# Patient Record
Sex: FEMALE | Race: WHITE | HISPANIC OR LATINO | Employment: FULL TIME | URBAN - METROPOLITAN AREA
[De-identification: names, ages, dates, MRNs, and addresses within clinical notes are randomized per-mention and may not be internally consistent; named-entity substitution may affect disease eponyms.]

---

## 2020-07-24 ENCOUNTER — HOSPITAL ENCOUNTER (EMERGENCY)
Facility: HOSPITAL | Age: 60
Discharge: HOME | End: 2020-07-24
Attending: EMERGENCY MEDICINE | Admitting: EMERGENCY MEDICINE

## 2020-07-24 ENCOUNTER — APPOINTMENT (OUTPATIENT)
Dept: CT IMAGING | Facility: HOSPITAL | Age: 60
End: 2020-07-24

## 2020-07-24 ENCOUNTER — APPOINTMENT (OUTPATIENT)
Dept: GENERAL RADIOLOGY | Facility: HOSPITAL | Age: 60
End: 2020-07-24

## 2020-07-24 ENCOUNTER — APPOINTMENT (OUTPATIENT)
Dept: CARDIOLOGY | Facility: HOSPITAL | Age: 60
End: 2020-07-24

## 2020-07-24 VITALS
HEIGHT: 64 IN | DIASTOLIC BLOOD PRESSURE: 74 MMHG | BODY MASS INDEX: 34.51 KG/M2 | TEMPERATURE: 98.4 F | HEART RATE: 87 BPM | WEIGHT: 202.16 LBS | SYSTOLIC BLOOD PRESSURE: 142 MMHG | OXYGEN SATURATION: 98 % | RESPIRATION RATE: 16 BRPM

## 2020-07-24 DIAGNOSIS — S16.1XXA STRAIN OF NECK MUSCLE, INITIAL ENCOUNTER: Primary | ICD-10-CM

## 2020-07-24 DIAGNOSIS — S96.912A STRAIN OF LEFT ANKLE, INITIAL ENCOUNTER: ICD-10-CM

## 2020-07-24 LAB
BH CV LOWER VASCULAR LEFT COMMON FEMORAL AUGMENT: NORMAL
BH CV LOWER VASCULAR LEFT COMMON FEMORAL COMPETENT: NORMAL
BH CV LOWER VASCULAR LEFT COMMON FEMORAL COMPRESS: NORMAL
BH CV LOWER VASCULAR LEFT COMMON FEMORAL PHASIC: NORMAL
BH CV LOWER VASCULAR LEFT COMMON FEMORAL SPONT: NORMAL
BH CV LOWER VASCULAR LEFT DISTAL FEMORAL COMPRESS: NORMAL
BH CV LOWER VASCULAR LEFT GASTRONEMIUS COMPRESS: NORMAL
BH CV LOWER VASCULAR LEFT GREATER SAPH AK COMPRESS: NORMAL
BH CV LOWER VASCULAR LEFT GREATER SAPH BK COMPRESS: NORMAL
BH CV LOWER VASCULAR LEFT LESSER SAPH COMPRESS: NORMAL
BH CV LOWER VASCULAR LEFT MID FEMORAL AUGMENT: NORMAL
BH CV LOWER VASCULAR LEFT MID FEMORAL COMPETENT: NORMAL
BH CV LOWER VASCULAR LEFT MID FEMORAL COMPRESS: NORMAL
BH CV LOWER VASCULAR LEFT MID FEMORAL PHASIC: NORMAL
BH CV LOWER VASCULAR LEFT MID FEMORAL SPONT: NORMAL
BH CV LOWER VASCULAR LEFT PERONEAL COMPRESS: NORMAL
BH CV LOWER VASCULAR LEFT POPLITEAL AUGMENT: NORMAL
BH CV LOWER VASCULAR LEFT POPLITEAL COMPETENT: NORMAL
BH CV LOWER VASCULAR LEFT POPLITEAL COMPRESS: NORMAL
BH CV LOWER VASCULAR LEFT POPLITEAL PHASIC: NORMAL
BH CV LOWER VASCULAR LEFT POPLITEAL SPONT: NORMAL
BH CV LOWER VASCULAR LEFT POSTERIOR TIBIAL COMPRESS: NORMAL
BH CV LOWER VASCULAR LEFT PROXIMAL FEMORAL COMPRESS: NORMAL
BH CV LOWER VASCULAR LEFT SAPHENOFEMORAL JUNCTION AUGMENT: NORMAL
BH CV LOWER VASCULAR LEFT SAPHENOFEMORAL JUNCTION COMPETENT: NORMAL
BH CV LOWER VASCULAR LEFT SAPHENOFEMORAL JUNCTION COMPRESS: NORMAL
BH CV LOWER VASCULAR LEFT SAPHENOFEMORAL JUNCTION PHASIC: NORMAL
BH CV LOWER VASCULAR LEFT SAPHENOFEMORAL JUNCTION SPONT: NORMAL
BH CV LOWER VASCULAR RIGHT COMMON FEMORAL AUGMENT: NORMAL
BH CV LOWER VASCULAR RIGHT COMMON FEMORAL COMPETENT: NORMAL
BH CV LOWER VASCULAR RIGHT COMMON FEMORAL COMPRESS: NORMAL
BH CV LOWER VASCULAR RIGHT COMMON FEMORAL PHASIC: NORMAL
BH CV LOWER VASCULAR RIGHT COMMON FEMORAL SPONT: NORMAL

## 2020-07-24 PROCEDURE — 72125 CT NECK SPINE W/O DYE: CPT

## 2020-07-24 PROCEDURE — 73610 X-RAY EXAM OF ANKLE: CPT

## 2020-07-24 PROCEDURE — 99282 EMERGENCY DEPT VISIT SF MDM: CPT

## 2020-07-24 PROCEDURE — 93971 EXTREMITY STUDY: CPT

## 2020-07-24 RX ORDER — MELOXICAM 15 MG/1
15 TABLET ORAL DAILY
Qty: 10 TABLET | Refills: 0 | Status: SHIPPED | OUTPATIENT
Start: 2020-07-24 | End: 2020-11-13 | Stop reason: RX
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
## 2020-11-13 ENCOUNTER — APPOINTMENT (OUTPATIENT)
Dept: CARDIOLOGY | Facility: HOSPITAL | Age: 60
End: 2020-11-13

## 2020-11-13 ENCOUNTER — HOSPITAL ENCOUNTER (OUTPATIENT)
Facility: HOSPITAL | Age: 60
Discharge: HOME | End: 2020-11-15
Attending: EMERGENCY MEDICINE | Admitting: INTERNAL MEDICINE

## 2020-11-13 ENCOUNTER — APPOINTMENT (OUTPATIENT)
Dept: GENERAL RADIOLOGY | Facility: HOSPITAL | Age: 60
End: 2020-11-13

## 2020-11-13 DIAGNOSIS — I21.3 ST ELEVATION MYOCARDIAL INFARCTION (STEMI), UNSPECIFIED ARTERY (HCC): Primary | ICD-10-CM

## 2020-11-13 PROBLEM — E66.9 OBESITY (BMI 30-39.9): Chronic | Status: ACTIVE | Noted: 2020-11-13

## 2020-11-13 PROBLEM — E78.00 HYPERCHOLESTEROLEMIA: Status: ACTIVE | Noted: 2020-11-13

## 2020-11-13 PROBLEM — I51.81 TAKOTSUBO CARDIOMYOPATHY: Status: ACTIVE | Noted: 2020-11-13

## 2020-11-13 PROBLEM — R07.9 CHEST PAIN: Status: ACTIVE | Noted: 2020-11-13

## 2020-11-13 PROBLEM — E11.9 DIABETES MELLITUS (HCC): Status: ACTIVE | Noted: 2020-11-13

## 2020-11-13 PROBLEM — I10 HTN (HYPERTENSION): Chronic | Status: ACTIVE | Noted: 2020-11-13

## 2020-11-13 PROBLEM — E03.9 HYPOTHYROIDISM: Status: ACTIVE | Noted: 2020-11-13

## 2020-11-13 LAB
ACT BLD: 153 SECONDS (ref 89–137)
ALBUMIN SERPL-MCNC: 4.3 G/DL (ref 3.5–5.2)
ALBUMIN/GLOB SERPL: 1.4 G/DL
ALP SERPL-CCNC: 108 U/L (ref 39–117)
ALT SERPL W P-5'-P-CCNC: 15 U/L (ref 1–33)
ANION GAP SERPL CALCULATED.3IONS-SCNC: 13 MMOL/L (ref 5–15)
APTT PPP: 23.6 SECONDS (ref 24–31)
AST SERPL-CCNC: 17 U/L (ref 1–32)
BASOPHILS # BLD AUTO: 0 10*3/MM3 (ref 0–0.2)
BASOPHILS NFR BLD AUTO: 0.5 % (ref 0–1.5)
BILIRUB SERPL-MCNC: 0.6 MG/DL (ref 0–1.2)
BUN SERPL-MCNC: 14 MG/DL (ref 8–23)
BUN/CREAT SERPL: 21.5 (ref 7–25)
CALCIUM SPEC-SCNC: 9.4 MG/DL (ref 8.6–10.5)
CHLORIDE SERPL-SCNC: 104 MMOL/L (ref 98–107)
CO2 SERPL-SCNC: 26 MMOL/L (ref 22–29)
CREAT SERPL-MCNC: 0.65 MG/DL (ref 0.57–1)
DEPRECATED RDW RBC AUTO: 41.6 FL (ref 37–54)
EOSINOPHIL # BLD AUTO: 0.1 10*3/MM3 (ref 0–0.4)
EOSINOPHIL NFR BLD AUTO: 1.5 % (ref 0.3–6.2)
ERYTHROCYTE [DISTWIDTH] IN BLOOD BY AUTOMATED COUNT: 14 % (ref 12.3–15.4)
GFR SERPL CREATININE-BSD FRML MDRD: 93 ML/MIN/1.73
GLOBULIN UR ELPH-MCNC: 3.1 GM/DL
GLUCOSE BLDC GLUCOMTR-MCNC: 209 MG/DL (ref 70–105)
GLUCOSE BLDC GLUCOMTR-MCNC: 214 MG/DL (ref 70–105)
GLUCOSE BLDC GLUCOMTR-MCNC: 226 MG/DL (ref 70–105)
GLUCOSE SERPL-MCNC: 248 MG/DL (ref 65–99)
HBA1C MFR BLD: 8.9 % (ref 3.5–5.6)
HCT VFR BLD AUTO: 39.2 % (ref 34–46.6)
HGB BLD-MCNC: 12.9 G/DL (ref 12–15.9)
HOLD SPECIMEN: NORMAL
HOLD SPECIMEN: NORMAL
INR PPP: <0.93 (ref 0.93–1.1)
LYMPHOCYTES # BLD AUTO: 2.3 10*3/MM3 (ref 0.7–3.1)
LYMPHOCYTES NFR BLD AUTO: 25.8 % (ref 19.6–45.3)
MAGNESIUM SERPL-MCNC: 1.8 MG/DL (ref 1.6–2.4)
MCH RBC QN AUTO: 27.5 PG (ref 26.6–33)
MCHC RBC AUTO-ENTMCNC: 32.9 G/DL (ref 31.5–35.7)
MCV RBC AUTO: 83.6 FL (ref 79–97)
MONOCYTES # BLD AUTO: 0.5 10*3/MM3 (ref 0.1–0.9)
MONOCYTES NFR BLD AUTO: 5.5 % (ref 5–12)
NEUTROPHILS NFR BLD AUTO: 5.9 10*3/MM3 (ref 1.7–7)
NEUTROPHILS NFR BLD AUTO: 66.7 % (ref 42.7–76)
NRBC BLD AUTO-RTO: 0 /100 WBC (ref 0–0.2)
PLATELET # BLD AUTO: 318 10*3/MM3 (ref 140–450)
PMV BLD AUTO: 8.4 FL (ref 6–12)
POTASSIUM SERPL-SCNC: 4.1 MMOL/L (ref 3.5–5.2)
PROT SERPL-MCNC: 7.4 G/DL (ref 6–8.5)
PROTHROMBIN TIME: 10.1 SECONDS (ref 9.6–11.7)
RBC # BLD AUTO: 4.69 10*6/MM3 (ref 3.77–5.28)
SARS-COV-2 RNA PNL SPEC NAA+PROBE: NOT DETECTED
SODIUM SERPL-SCNC: 143 MMOL/L (ref 136–145)
TROPONIN T SERPL-MCNC: 0.14 NG/ML (ref 0–0.03)
TROPONIN T SERPL-MCNC: 0.17 NG/ML (ref 0–0.03)
WBC # BLD AUTO: 8.9 10*3/MM3 (ref 3.4–10.8)
WHOLE BLOOD HOLD SPECIMEN: NORMAL
WHOLE BLOOD HOLD SPECIMEN: NORMAL

## 2020-11-13 PROCEDURE — 83735 ASSAY OF MAGNESIUM: CPT | Performed by: EMERGENCY MEDICINE

## 2020-11-13 PROCEDURE — 93306 TTE W/DOPPLER COMPLETE: CPT | Performed by: INTERNAL MEDICINE

## 2020-11-13 PROCEDURE — 82962 GLUCOSE BLOOD TEST: CPT

## 2020-11-13 PROCEDURE — 96368 THER/DIAG CONCURRENT INF: CPT

## 2020-11-13 PROCEDURE — 93005 ELECTROCARDIOGRAM TRACING: CPT | Performed by: EMERGENCY MEDICINE

## 2020-11-13 PROCEDURE — G0378 HOSPITAL OBSERVATION PER HR: HCPCS

## 2020-11-13 PROCEDURE — C1769 GUIDE WIRE: HCPCS | Performed by: INTERNAL MEDICINE

## 2020-11-13 PROCEDURE — 85025 COMPLETE CBC W/AUTO DIFF WBC: CPT | Performed by: EMERGENCY MEDICINE

## 2020-11-13 PROCEDURE — 84484 ASSAY OF TROPONIN QUANT: CPT | Performed by: EMERGENCY MEDICINE

## 2020-11-13 PROCEDURE — 87635 SARS-COV-2 COVID-19 AMP PRB: CPT | Performed by: EMERGENCY MEDICINE

## 2020-11-13 PROCEDURE — 85347 COAGULATION TIME ACTIVATED: CPT

## 2020-11-13 PROCEDURE — 25010000002 EPTIFIBATIDE PER 5 MG: Performed by: EMERGENCY MEDICINE

## 2020-11-13 PROCEDURE — 93458 L HRT ARTERY/VENTRICLE ANGIO: CPT | Performed by: INTERNAL MEDICINE

## 2020-11-13 PROCEDURE — 85610 PROTHROMBIN TIME: CPT | Performed by: EMERGENCY MEDICINE

## 2020-11-13 PROCEDURE — 96365 THER/PROPH/DIAG IV INF INIT: CPT

## 2020-11-13 PROCEDURE — 93306 TTE W/DOPPLER COMPLETE: CPT

## 2020-11-13 PROCEDURE — 99284 EMERGENCY DEPT VISIT MOD MDM: CPT

## 2020-11-13 PROCEDURE — 96375 TX/PRO/DX INJ NEW DRUG ADDON: CPT

## 2020-11-13 PROCEDURE — 25010000002 ONDANSETRON PER 1 MG: Performed by: EMERGENCY MEDICINE

## 2020-11-13 PROCEDURE — C1894 INTRO/SHEATH, NON-LASER: HCPCS | Performed by: INTERNAL MEDICINE

## 2020-11-13 PROCEDURE — 0 IOPAMIDOL PER 1 ML: Performed by: INTERNAL MEDICINE

## 2020-11-13 PROCEDURE — C9803 HOPD COVID-19 SPEC COLLECT: HCPCS

## 2020-11-13 PROCEDURE — 80053 COMPREHEN METABOLIC PANEL: CPT | Performed by: EMERGENCY MEDICINE

## 2020-11-13 PROCEDURE — 25010000002 HEPARIN (PORCINE) PER 1000 UNITS: Performed by: EMERGENCY MEDICINE

## 2020-11-13 PROCEDURE — 25010000002 FENTANYL CITRATE (PF) 100 MCG/2ML SOLUTION: Performed by: EMERGENCY MEDICINE

## 2020-11-13 PROCEDURE — 83036 HEMOGLOBIN GLYCOSYLATED A1C: CPT | Performed by: NURSE PRACTITIONER

## 2020-11-13 PROCEDURE — 85730 THROMBOPLASTIN TIME PARTIAL: CPT | Performed by: EMERGENCY MEDICINE

## 2020-11-13 PROCEDURE — 71045 X-RAY EXAM CHEST 1 VIEW: CPT

## 2020-11-13 PROCEDURE — 25010000002 SULFUR HEXAFLUORIDE MICROSPH 60.7-25 MG RECONSTITUTED SUSPENSION: Performed by: STUDENT IN AN ORGANIZED HEALTH CARE EDUCATION/TRAINING PROGRAM

## 2020-11-13 PROCEDURE — 99245 OFF/OP CONSLTJ NEW/EST HI 55: CPT | Performed by: INTERNAL MEDICINE

## 2020-11-13 RX ORDER — ATORVASTATIN CALCIUM 80 MG/1
40 TABLET, FILM COATED ORAL DAILY
COMMUNITY

## 2020-11-13 RX ORDER — METOPROLOL SUCCINATE 50 MG/1
50 TABLET, EXTENDED RELEASE ORAL DAILY
COMMUNITY

## 2020-11-13 RX ORDER — FENTANYL CITRATE 50 UG/ML
INJECTION, SOLUTION INTRAMUSCULAR; INTRAVENOUS
Status: COMPLETED | OUTPATIENT
Start: 2020-11-13 | End: 2020-11-13

## 2020-11-13 RX ORDER — SODIUM CHLORIDE 9 MG/ML
75 INJECTION, SOLUTION INTRAVENOUS CONTINUOUS
Status: DISCONTINUED | OUTPATIENT
Start: 2020-11-13 | End: 2020-11-15 | Stop reason: HOSPADM

## 2020-11-13 RX ORDER — SODIUM CHLORIDE 0.9 % (FLUSH) 0.9 %
10 SYRINGE (ML) INJECTION AS NEEDED
Status: DISCONTINUED | OUTPATIENT
Start: 2020-11-13 | End: 2020-11-15 | Stop reason: HOSPADM

## 2020-11-13 RX ORDER — SERTRALINE HYDROCHLORIDE 25 MG/1
50 TABLET, FILM COATED ORAL DAILY PRN
Status: ON HOLD | COMMUNITY
End: 2020-11-15 | Stop reason: SDUPTHER

## 2020-11-13 RX ORDER — ASPIRIN 325 MG
325 TABLET ORAL ONCE
Status: DISCONTINUED | OUTPATIENT
Start: 2020-11-13 | End: 2020-11-13 | Stop reason: SDUPTHER

## 2020-11-13 RX ORDER — INSULIN LISPRO 100 [IU]/ML
0-9 INJECTION, SOLUTION INTRAVENOUS; SUBCUTANEOUS AS NEEDED
Status: DISCONTINUED | OUTPATIENT
Start: 2020-11-13 | End: 2020-11-15 | Stop reason: HOSPADM

## 2020-11-13 RX ORDER — ONDANSETRON 2 MG/ML
INJECTION INTRAMUSCULAR; INTRAVENOUS
Status: COMPLETED | OUTPATIENT
Start: 2020-11-13 | End: 2020-11-13

## 2020-11-13 RX ORDER — METFORMIN HYDROCHLORIDE 500 MG/1
1000 TABLET, EXTENDED RELEASE ORAL 2 TIMES DAILY
COMMUNITY
End: 2022-05-04 | Stop reason: SDUPTHER

## 2020-11-13 RX ORDER — ASPIRIN 325 MG
325 TABLET ORAL ONCE
Status: COMPLETED | OUTPATIENT
Start: 2020-11-13 | End: 2020-11-13

## 2020-11-13 RX ORDER — NITROGLYCERIN 20 MG/100ML
10-50 INJECTION INTRAVENOUS
Status: DISCONTINUED | OUTPATIENT
Start: 2020-11-13 | End: 2020-11-15 | Stop reason: HOSPADM

## 2020-11-13 RX ORDER — CYCLOBENZAPRINE HCL 10 MG
5 TABLET ORAL 3 TIMES DAILY PRN
Status: DISCONTINUED | OUTPATIENT
Start: 2020-11-13 | End: 2020-11-15 | Stop reason: HOSPADM

## 2020-11-13 RX ORDER — SODIUM CHLORIDE 9 MG/ML
250 INJECTION, SOLUTION INTRAVENOUS ONCE AS NEEDED
Status: DISCONTINUED | OUTPATIENT
Start: 2020-11-13 | End: 2020-11-15 | Stop reason: HOSPADM

## 2020-11-13 RX ORDER — METOPROLOL SUCCINATE 50 MG/1
50 TABLET, EXTENDED RELEASE ORAL DAILY
Status: DISCONTINUED | OUTPATIENT
Start: 2020-11-14 | End: 2020-11-15 | Stop reason: HOSPADM

## 2020-11-13 RX ORDER — HYDRALAZINE HYDROCHLORIDE 25 MG/1
50 TABLET, FILM COATED ORAL EVERY 12 HOURS SCHEDULED
Status: DISCONTINUED | OUTPATIENT
Start: 2020-11-13 | End: 2020-11-15

## 2020-11-13 RX ORDER — ATORVASTATIN CALCIUM 40 MG/1
40 TABLET, FILM COATED ORAL NIGHTLY
Status: DISCONTINUED | OUTPATIENT
Start: 2020-11-13 | End: 2020-11-15 | Stop reason: HOSPADM

## 2020-11-13 RX ORDER — LEVOTHYROXINE SODIUM 137 UG/1
137 TABLET ORAL DAILY
COMMUNITY

## 2020-11-13 RX ORDER — INSULIN LISPRO 100 [IU]/ML
0-9 INJECTION, SOLUTION INTRAVENOUS; SUBCUTANEOUS
Status: DISCONTINUED | OUTPATIENT
Start: 2020-11-13 | End: 2020-11-15 | Stop reason: HOSPADM

## 2020-11-13 RX ORDER — EPTIFIBATIDE 0.75 MG/ML
INJECTION, SOLUTION INTRAVENOUS
Status: COMPLETED | OUTPATIENT
Start: 2020-11-13 | End: 2020-11-13

## 2020-11-13 RX ORDER — CYCLOBENZAPRINE HCL 5 MG
5 TABLET ORAL 3 TIMES DAILY PRN
COMMUNITY

## 2020-11-13 RX ORDER — DEXTROSE MONOHYDRATE 25 G/50ML
25 INJECTION, SOLUTION INTRAVENOUS
Status: DISCONTINUED | OUTPATIENT
Start: 2020-11-13 | End: 2020-11-15 | Stop reason: HOSPADM

## 2020-11-13 RX ORDER — NICOTINE POLACRILEX 4 MG
15 LOZENGE BUCCAL
Status: DISCONTINUED | OUTPATIENT
Start: 2020-11-13 | End: 2020-11-15 | Stop reason: HOSPADM

## 2020-11-13 RX ORDER — LISINOPRIL 5 MG/1
5 TABLET ORAL DAILY
Status: DISCONTINUED | OUTPATIENT
Start: 2020-11-14 | End: 2020-11-13

## 2020-11-13 RX ORDER — ACETAMINOPHEN 325 MG/1
650 TABLET ORAL EVERY 4 HOURS PRN
Status: DISCONTINUED | OUTPATIENT
Start: 2020-11-13 | End: 2020-11-15 | Stop reason: HOSPADM

## 2020-11-13 RX ORDER — HEPARIN SODIUM 5000 [USP'U]/ML
INJECTION, SOLUTION INTRAVENOUS; SUBCUTANEOUS
Status: COMPLETED | OUTPATIENT
Start: 2020-11-13 | End: 2020-11-13

## 2020-11-13 RX ORDER — LIDOCAINE HYDROCHLORIDE 20 MG/ML
INJECTION, SOLUTION INFILTRATION; PERINEURAL AS NEEDED
Status: DISCONTINUED | OUTPATIENT
Start: 2020-11-13 | End: 2020-11-13 | Stop reason: HOSPADM

## 2020-11-13 RX ORDER — HEPARIN SODIUM 5000 [USP'U]/ML
4000 INJECTION, SOLUTION INTRAVENOUS; SUBCUTANEOUS ONCE
Status: DISCONTINUED | OUTPATIENT
Start: 2020-11-13 | End: 2020-11-15 | Stop reason: HOSPADM

## 2020-11-13 RX ADMIN — ATORVASTATIN CALCIUM 40 MG: 40 TABLET, FILM COATED ORAL at 20:44

## 2020-11-13 RX ADMIN — ONDANSETRON 4 MG: 2 INJECTION INTRAMUSCULAR; INTRAVENOUS at 12:41

## 2020-11-13 RX ADMIN — EPTIFIBATIDE 16254 MCG: 0.75 INJECTION, SOLUTION INTRAVENOUS at 12:44

## 2020-11-13 RX ADMIN — ASPIRIN 325 MG ORAL TABLET 325 MG: 325 PILL ORAL at 12:36

## 2020-11-13 RX ADMIN — NITROGLYCERIN 15 MCG/MIN: 20 INJECTION INTRAVENOUS at 13:35

## 2020-11-13 RX ADMIN — HYDRALAZINE HYDROCHLORIDE 50 MG: 25 TABLET, FILM COATED ORAL at 20:44

## 2020-11-13 RX ADMIN — FENTANYL CITRATE 50 MCG: 50 INJECTION, SOLUTION INTRAMUSCULAR; INTRAVENOUS at 12:41

## 2020-11-13 RX ADMIN — SULFUR HEXAFLUORIDE 2 ML: KIT at 17:15

## 2020-11-13 RX ADMIN — EPTIFIBATIDE 2 MCG/KG/MIN: 0.75 INJECTION, SOLUTION INTRAVENOUS at 12:43

## 2020-11-13 RX ADMIN — NITROGLYCERIN 10 MCG/MIN: 20 INJECTION INTRAVENOUS at 12:36

## 2020-11-13 RX ADMIN — HEPARIN SODIUM 4000 UNITS: 5000 INJECTION INTRAVENOUS; SUBCUTANEOUS at 12:40

## 2020-11-13 NOTE — ED NOTES
Pt presents to the ED with c/o chest pain since 0900. Pt describes the pain as constant, with varying levels of severity. Pt also reports different bp readings between left and right arm. Pt reports pain making it difficult to breath and pain radiating into her back .     Xin Taylor RN  11/13/20 4523

## 2020-11-13 NOTE — PLAN OF CARE
Problem: Adult Inpatient Plan of Care  Goal: Plan of Care Review  Recent Flowsheet Documentation  Taken 11/13/2020 1335 by Jen Barreto RN  Plan of Care Reviewed With: patient     Problem: Adult Inpatient Plan of Care  Goal: Absence of Hospital-Acquired Illness or Injury  Intervention: Prevent Infection  Recent Flowsheet Documentation  Taken 11/13/2020 1555 by Jen Barreto RN  Infection Prevention: personal protective equipment utilized  Taken 11/13/2020 1525 by Jen Barreto RN  Infection Prevention: personal protective equipment utilized  Taken 11/13/2020 1510 by Jen Barreto RN  Infection Prevention: personal protective equipment utilized  Taken 11/13/2020 1455 by Jen Barreto RN  Infection Prevention: personal protective equipment utilized  Taken 11/13/2020 1440 by Jen Barreto RN  Infection Prevention: personal protective equipment utilized  Taken 11/13/2020 1425 by Jen Barreto RN  Infection Prevention: personal protective equipment utilized  Taken 11/13/2020 1405 by Jen Barreto RN  Infection Prevention: personal protective equipment utilized  Taken 11/13/2020 1335 by Jen Barreto RN  Infection Prevention: personal protective equipment utilized

## 2020-11-13 NOTE — CONSULTS
Referring Provider: Hospitalist  Reason for Consultation: Chest pain    Patient Care Team:  Sandi Allen APRN as PCP - General (Nurse Practitioner)    Chief complaint chest pain    Subjective .     History of present illness:  Caty Barnes is a 60 y.o. female with history of diabetes hypertension hyperlipidemia and hypothyroidism presented to the hospital complains of chest pain.  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  Patient chest pain is mostly substernal without any radiation but associated with shortness of breath and no diaphoresis.  No complains any PND orthopnea.  No palpitation dizziness syncope or swelling of the feet.  Review of Systems   Constitution: Negative for fever and malaise/fatigue.   HENT: Negative for ear pain and nosebleeds.    Eyes: Negative for blurred vision and double vision.   Cardiovascular: Positive for chest pain. Negative for dyspnea on exertion and palpitations.   Respiratory: Positive for shortness of breath. Negative for cough.    Skin: Negative for rash.   Musculoskeletal: Negative for joint pain.   Gastrointestinal: Negative for abdominal pain, nausea and vomiting.   Neurological: Negative for focal weakness and headaches.   Psychiatric/Behavioral: Negative for depression. The patient is not nervous/anxious.    All other systems reviewed and are negative.      History  History reviewed. No pertinent past medical history.  #1 hypertension #2 diabetes #3 hyperlipidemia    History reviewed. No pertinent surgical history.    No family history on file.  Any strong family history of coronary disease    Social History     Tobacco Use   • Smoking status: Not on file   Substance Use Topics   • Alcohol use: Not on file   • Drug use: Not on file        Medications Prior to Admission   Medication Sig Dispense Refill Last Dose   •  "atorvastatin (LIPITOR) 40 MG tablet Take 40 mg by mouth Daily.   11/12/2020 at Unknown time   • levothyroxine (SYNTHROID, LEVOTHROID) 137 MCG tablet Take 137 mcg by mouth Daily.   11/12/2020 at Unknown time   • metFORMIN ER (GLUCOPHAGE-XR) 500 MG 24 hr tablet Take 1,000 mg by mouth 2 (two) times a day.   11/12/2020 at Unknown time   • metoprolol succinate XL (TOPROL-XL) 50 MG 24 hr tablet Take 50 mg by mouth Daily.   11/13/2020 at Unknown time   • sertraline (ZOLOFT) 25 MG tablet Take 50 mg by mouth Daily As Needed (Anx.).   11/12/2020 at Unknown time   • cyclobenzaprine (FLEXERIL) 5 MG tablet Take 5 mg by mouth 3 (Three) Times a Day As Needed for Muscle Spasms.   Unknown at Unknown time         Naproxen, Nifedipine, and Sulfa antibiotics    Scheduled Meds:atorvastatin, 40 mg, Oral, Nightly  [MAR Hold] heparin (porcine), 4,000 Units, Intravenous, Once  levothyroxine, 137 mcg, Oral, Daily  lisinopril, 5 mg, Oral, Daily  metoprolol succinate XL, 50 mg, Oral, Daily      Continuous Infusions:nitroglycerin, 10-50 mcg/min, Last Rate: 10 mcg/min (11/13/20 1236)  sodium chloride, 75 mL/hr      PRN Meds:.•  acetaminophen  •  atropine  •  cyclobenzaprine  •  sertraline  •  [MAR Hold] sodium chloride  •  sodium chloride    Objective     VITAL SIGNS  Vitals:    11/13/20 1237 11/13/20 1245 11/13/20 1254 11/13/20 1312   BP: (!) 164/109 (!) 154/102 153/97 (!) 164/103   BP Location: Left arm Left arm Left arm    Patient Position:       Pulse: 88 83 79    Resp: 13 12 13    Temp:       TempSrc:       SpO2: 98% 99% 99%    Weight:       Height:           Flowsheet Rows      First Filed Value   Admission Height  162.6 cm (64\") Documented at 11/13/2020 1205   Admission Weight  90.3 kg (199 lb 1.2 oz) Documented at 11/13/2020 1205           TELEMETRY: Sinus rhythm with ST segment abnormality in 1 and aVL    Physical Exam:  Constitutional:       Appearance: Well-developed.   Eyes:      General: No scleral icterus.     Conjunctiva/sclera: " Conjunctivae normal.      Pupils: Pupils are equal, round, and reactive to light.   HENT:      Head: Normocephalic and atraumatic.   Neck:      Musculoskeletal: Normal range of motion and neck supple.      Vascular: No carotid bruit or JVD.   Pulmonary:      Effort: Pulmonary effort is normal.      Breath sounds: Normal breath sounds. No wheezing. No rales.   Cardiovascular:      Normal rate. Regular rhythm.   Pulses:     Intact distal pulses.   Abdominal:      General: Bowel sounds are normal.      Palpations: Abdomen is soft.   Musculoskeletal: Normal range of motion.   Skin:     General: Skin is warm and dry.      Findings: No rash.   Neurological:      Mental Status: Alert.      Comments: No focal deficits          Results Review:   I reviewed the patient's new clinical results.  Lab Results (last 24 hours)     Procedure Component Value Units Date/Time    COVID PRE-OP / PRE-PROCEDURE SCREENING ORDER (NO ISOLATION) - Swab, Nasopharynx [[id number]]  (Normal) Collected: 11/13/20 1257    Specimen: Swab from Nasopharynx Updated: 11/13/20 1345    Narrative:      The following orders were created for panel order COVID PRE-OP / PRE-PROCEDURE SCREENING ORDER (NO ISOLATION) - Swab, Nasopharynx.  Procedure                               Abnormality         Status                     ---------                               -----------         ------                     COVID-19,CEPHEID,COR/SAE...[[id number]]  Normal              Final result                 Please view results for these tests on the individual orders.    COVID-19,CEPHEID,COR/SAE/PAD IN-HOUSE(OR EMERGENT/ADD-ON),NP SWAB IN TRANSPORT MEDIA 3-4 HR TAT - Swab, Nasopharynx [[id number]]  (Normal) Collected: 11/13/20 1257    Specimen: Swab from Nasopharynx Updated: 11/13/20 1345     COVID19 Not Detected    Narrative:      Fact sheet for providers: https://www.fda.gov/media/240388/download     Fact sheet for patients: https://www.fda.gov/media/595612/download     Troponin [[id number]]  (Abnormal) Collected: 11/13/20 1228    Specimen: Blood Updated: 11/13/20 1311     Troponin T 0.137 ng/mL     Narrative:      Troponin T Reference Range:  <= 0.03 ng/mL-   Negative for AMI  >0.03 ng/mL-     Abnormal for myocardial necrosis.  Clinicians would have to utilize clinical acumen, EKG, Troponin and serial changes to determine if it is an Acute Myocardial Infarction or myocardial injury due to an underlying chronic condition.       Results may be falsely decreased if patient taking Biotin.      Comprehensive Metabolic Panel [[id number]]  (Abnormal) Collected: 11/13/20 1228    Specimen: Blood Updated: 11/13/20 1309     Glucose 248 mg/dL      BUN 14 mg/dL      Creatinine 0.65 mg/dL      Sodium 143 mmol/L      Potassium 4.1 mmol/L      Chloride 104 mmol/L      CO2 26.0 mmol/L      Calcium 9.4 mg/dL      Total Protein 7.4 g/dL      Albumin 4.30 g/dL      ALT (SGPT) 15 U/L      AST (SGOT) 17 U/L      Alkaline Phosphatase 108 U/L      Total Bilirubin 0.6 mg/dL      eGFR Non African Amer 93 mL/min/1.73      Globulin 3.1 gm/dL      A/G Ratio 1.4 g/dL      BUN/Creatinine Ratio 21.5     Anion Gap 13.0 mmol/L     Narrative:      GFR Normal >60  Chronic Kidney Disease <60  Kidney Failure <15      Magnesium [[id number]]  (Normal) Collected: 11/13/20 1228    Specimen: Blood Updated: 11/13/20 1309     Magnesium 1.8 mg/dL     Protime-INR [[id number]]  (Abnormal) Collected: 11/13/20 1228    Specimen: Blood Updated: 11/13/20 1255     Protime 10.1 Seconds      INR <0.93    aPTT [[id number]]  (Abnormal) Collected: 11/13/20 1228    Specimen: Blood Updated: 11/13/20 1255     PTT 23.6 seconds     CBC & Differential [[id number]]  (Normal) Collected: 11/13/20 1228    Specimen: Blood Updated: 11/13/20 1239    Narrative:      The following orders were created for panel order CBC & Differential.  Procedure                               Abnormality         Status                     ---------                                -----------         ------                     CBC Auto Differential[[id number]]        Normal              Final result                 Please view results for these tests on the individual orders.    CBC Auto Differential [[id number]]  (Normal) Collected: 11/13/20 1228    Specimen: Blood Updated: 11/13/20 1239     WBC 8.90 10*3/mm3      RBC 4.69 10*6/mm3      Hemoglobin 12.9 g/dL      Hematocrit 39.2 %      MCV 83.6 fL      MCH 27.5 pg      MCHC 32.9 g/dL      RDW 14.0 %      RDW-SD 41.6 fl      MPV 8.4 fL      Platelets 318 10*3/mm3      Neutrophil % 66.7 %      Lymphocyte % 25.8 %      Monocyte % 5.5 %      Eosinophil % 1.5 %      Basophil % 0.5 %      Neutrophils, Absolute 5.90 10*3/mm3      Lymphocytes, Absolute 2.30 10*3/mm3      Monocytes, Absolute 0.50 10*3/mm3      Eosinophils, Absolute 0.10 10*3/mm3      Basophils, Absolute 0.00 10*3/mm3      nRBC 0.0 /100 WBC           Imaging Results (Last 24 Hours)     Procedure Component Value Units Date/Time    XR Chest 1 View [[id number]] Collected: 11/13/20 1242     Updated: 11/13/20 1246    Narrative:      EXAMINATION: XR CHEST 1 VW-     DATE OF EXAM: 11/13/2020 12:27 PM     INDICATION: Chest pain, nonsmoker     COMPARISON: None available     TECHNIQUE: Portable AP view of the chest was obtained.     FINDINGS:  The cardiac silhouette is enlarged. There is aortic arch athetotic  calcification. Pulmonary vascularity appears within normal limits. There  is a focal opacity within the right cardiophrenic angle which may  reflect atelectasis or infection. The left lung appears clear. There is  no pleural effusion or pneumothorax. There are degenerative changes of  the thoracic spine.       Impression:      1. Focal opacity within the right cardiophrenic angle which may reflect  atelectasis or infection.     Electronically Signed By-Wayne Poon MD On:11/13/2020 12:44 PM  This report was finalized on [id number] by  Wayne Poon MD.          EKG      I  personally viewed and interpreted the patient's EKG/Telemetry data:    ECHOCARDIOGRAM:      STRESS MYOVIEW:    CARDIAC CATHETERIZATION:    OTHER:         Assessment/Plan     Chest pain with ST segment abnormality consistent with a STEMI  Diabetes  Hypertension  Hyperlipidemia    Admitted with chest pain and had ST segment elevation in 1 and aVL consistent with STEMI  Patient is taken to cardiac catheterization laboratory and had a cardiac catheterization which showed normal coronaries and LV dysfunction consistent Takotsubo cardiomyopathy  Patient is already on beta-blockers and will be placed on ACE inhibitor's  Patient blood pressure and heart rate will be monitored carefully  Patient will have an echocardiogram for LV function valve abnormalities  Patient's sugar levels and lipid levels are followed by the primary care doctor.    I discussed the patients findings and my recommendations with patient and nurse    Luis Ambrosio MD  11/13/20  13:47 EST

## 2020-11-13 NOTE — ED NOTES
Waiting on call from cath lab to let us know they are ready for patient. Will cont to monitor.      Xin Taylor RN  11/13/20 5175

## 2020-11-13 NOTE — ED PROVIDER NOTES
Subjective   Chief complaint: Patient is a 60-year-old female.  She was at work.  She started to have chest tightness across the front of her chest.  She states that it went down both arms.  She states that she could not get a good blood pressure left arm.  Her right arm was hypertensive.  When she came here.  Patient has had no fever no cough.  Patient has had no further cardiac work-up.  She does have a hyperlipidemia hypertension and diabetes.  Patient does note left leg pain since an injury to her ankle last summer.  She is never had a PE.  No pain when she breathes in.    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  She was discussing the case with the colleague while colleague out medications for patient and started to have pain.    Duration: 9 AM    Timing: Waxes and wanes but persistently there    Severity: Can be severe currently moderate    Associated Symptoms: Negative except as noted above.  Appropriate PPE was used.        PCP:            Review of Systems   Constitutional: Negative.    HENT: Negative.    Eyes: Negative.    Respiratory: Negative.    Cardiovascular: Positive for chest pain.   Gastrointestinal: Negative.    Genitourinary: Negative.    Musculoskeletal:        Left leg pain   Skin: Negative.    Neurological: Negative.    Psychiatric/Behavioral: Negative.        History reviewed. No pertinent past medical history.    Allergies   Allergen Reactions   • Naproxen Anaphylaxis   • Nifedipine Anaphylaxis   • Sulfa Antibiotics GI Intolerance       History reviewed. No pertinent surgical history.    No family history on file.    Social History     Socioeconomic History   • Marital status:      Spouse name: Not on file   • Number of children: Not on file   • Years of education: Not on file   • Highest education level: Not on file           Objective   Physical Exam  Constitutional:       Appearance: She is well-developed.   HENT:      Head: Normocephalic and atraumatic.   Eyes:      Extraocular Movements:  Extraocular movements intact.   Neck:      Musculoskeletal: Neck supple.   Cardiovascular:      Rate and Rhythm: Normal rate and regular rhythm.      Heart sounds: Normal heart sounds.      Comments: Blood pressures in both arms were consistently hypertensive and approximately equal.  Pulses were equal.  Pulmonary:      Effort: Pulmonary effort is normal.      Breath sounds: Normal breath sounds.   Chest:      Chest wall: No tenderness.   Abdominal:      Palpations: Abdomen is soft.      Tenderness: There is no abdominal tenderness.   Musculoskeletal: Normal range of motion.      Comments: Patient no significant tenderness in the left calf.   Skin:     General: Skin is warm and dry.   Neurological:      General: No focal deficit present.      Mental Status: She is alert.   Psychiatric:         Mood and Affect: Mood normal.         Behavior: Behavior normal.         Procedures           ED Course      EKG interpreted by myself does show mild ST elevation in the 1 and aVL lateral leads.  There is some reciprocal depression minimally in the inferior leads.  It is sinus rhythm.  With a rate of 87.           Results for orders placed or performed during the hospital encounter of 11/13/20   CBC Auto Differential    Specimen: Blood   Result Value Ref Range    WBC 8.90 3.40 - 10.80 10*3/mm3    RBC 4.69 3.77 - 5.28 10*6/mm3    Hemoglobin 12.9 12.0 - 15.9 g/dL    Hematocrit 39.2 34.0 - 46.6 %    MCV 83.6 79.0 - 97.0 fL    MCH 27.5 26.6 - 33.0 pg    MCHC 32.9 31.5 - 35.7 g/dL    RDW 14.0 12.3 - 15.4 %    RDW-SD 41.6 37.0 - 54.0 fl    MPV 8.4 6.0 - 12.0 fL    Platelets 318 140 - 450 10*3/mm3    Neutrophil % 66.7 42.7 - 76.0 %    Lymphocyte % 25.8 19.6 - 45.3 %    Monocyte % 5.5 5.0 - 12.0 %    Eosinophil % 1.5 0.3 - 6.2 %    Basophil % 0.5 0.0 - 1.5 %    Neutrophils, Absolute 5.90 1.70 - 7.00 10*3/mm3    Lymphocytes, Absolute 2.30 0.70 - 3.10 10*3/mm3    Monocytes, Absolute 0.50 0.10 - 0.90 10*3/mm3    Eosinophils, Absolute  0.10 0.00 - 0.40 10*3/mm3    Basophils, Absolute 0.00 0.00 - 0.20 10*3/mm3    nRBC 0.0 0.0 - 0.2 /100 WBC   ECG 12 Lead   Result Value Ref Range    QT Interval 387 ms     Results for orders placed or performed during the hospital encounter of 11/13/20   CBC Auto Differential    Specimen: Blood   Result Value Ref Range    WBC 8.90 3.40 - 10.80 10*3/mm3    RBC 4.69 3.77 - 5.28 10*6/mm3    Hemoglobin 12.9 12.0 - 15.9 g/dL    Hematocrit 39.2 34.0 - 46.6 %    MCV 83.6 79.0 - 97.0 fL    MCH 27.5 26.6 - 33.0 pg    MCHC 32.9 31.5 - 35.7 g/dL    RDW 14.0 12.3 - 15.4 %    RDW-SD 41.6 37.0 - 54.0 fl    MPV 8.4 6.0 - 12.0 fL    Platelets 318 140 - 450 10*3/mm3    Neutrophil % 66.7 42.7 - 76.0 %    Lymphocyte % 25.8 19.6 - 45.3 %    Monocyte % 5.5 5.0 - 12.0 %    Eosinophil % 1.5 0.3 - 6.2 %    Basophil % 0.5 0.0 - 1.5 %    Neutrophils, Absolute 5.90 1.70 - 7.00 10*3/mm3    Lymphocytes, Absolute 2.30 0.70 - 3.10 10*3/mm3    Monocytes, Absolute 0.50 0.10 - 0.90 10*3/mm3    Eosinophils, Absolute 0.10 0.00 - 0.40 10*3/mm3    Basophils, Absolute 0.00 0.00 - 0.20 10*3/mm3    nRBC 0.0 0.0 - 0.2 /100 WBC   ECG 12 Lead   Result Value Ref Range    QT Interval 387 ms                                  MDM  Number of Diagnoses or Management Options  ST elevation myocardial infarction (STEMI), unspecified artery (CMS/HCC):   Diagnosis management comments: After evaluate the patient she is not toxic at this point time.  However she has had persistent pain since 9 AM.  She does have a concerning EKG.  The ST elevation is not 2 mm elevated however does show some mild lateral ST elevation and some mild inferior reciprocal ST depression.  I did discuss this with Dr. Bello on-call for the Cath Lab.  He then spoke with Dr. Ambrosio in agreement that the EKG is consistent with STEMI along with the patient's story of chest pain going down both arms.  She does have multiple risk factors for heart disease as well with diabetes hypertension and  hyperlipidemia.  At this time acute STEMI protocol was started and patient will be transferred straight to the cardiac catheterization lab emergently.  She does have symmetric pulses in her arms.  Her blood pressures are symmetrically elevated.  I do not think she has a dissection.  The patient does not look toxic at this time.  Her pain is actually somewhat better than at other points during the morning but it is still there and significant.       Amount and/or Complexity of Data Reviewed  Clinical lab tests: reviewed  Tests in the medicine section of CPT®: reviewed  Discussion of test results with the performing providers: yes  Discuss the patient with other providers: yes  Independent visualization of images, tracings, or specimens: yes    Risk of Complications, Morbidity, and/or Mortality  Presenting problems: high  Diagnostic procedures: high  Management options: high    Critical Care  Total time providing critical care: 30-74 minutes    Patient Progress  Patient progress: other (comment)      Final diagnoses:   None     ST elevation MI       Ayo Smith, DO  11/13/20 1242       Ayo Smith, DO  11/13/20 1243       Ayo Smith, DO  11/13/20 1254

## 2020-11-13 NOTE — H&P
[facility] Medicine Services      Patient Name: Caty Barnes  : 1960  MRN: [id number]  Primary Care Physician: Sandi Allen APRN  Date of admission: 2020    Patient Care Team:  Sandi Allen APRN as PCP - General (Nurse Practitioner)          Subjective   History Present Illness     Chief Complaint:   Chief Complaint   Patient presents with   • Chest Pain         Ms. Barnes is a 60 y.o. hypertension, hyperlipidemia, hypothyroidism, and diabetes mellitus who presented to [facility] on 2020 with complaints of chest pain.  Patient explains that she went to work today and began to develop chest pain.  She explained that it felt like something was very heavy on her chest.  She reports this never happened before.  Everything made her chest pain worse and she denies any alleviating factors.  She did explain last night she did have dizziness.  She denied any recent heart palpitations, nausea, vomiting, diarrhea, fever, chills, cough, or shortness of breath.    In the ED, chest x-ray showed Focal opacity within the right cardiophrenic angle which may reflect  atelectasis or infection.  EKG showed sinus rhythm.  All labs are unremarkable upon admission except troponin 0.13, 0.16.    Patient was taken to Cath Lab shortly after arrival to the ED due to elevated troponins and abnormal EKG showing ST segment elevation in 1 and aVL consistent with STEMI . Patient was found to have Takotsubo  Cardiomyopathy.  Dr. Ambrosio contacted the hospitalist to admit patient for further care management.          Review of Systems   Constitution: Negative.   HENT: Negative.    Cardiovascular: Positive for chest pain.   Respiratory: Negative.    Skin: Negative.    Musculoskeletal: Negative.    Gastrointestinal: Negative.    Genitourinary: Negative.    Neurological: Positive for dizziness.   Psychiatric/Behavioral: Negative.            Personal History     Past Medical History:    Past Medical History:   Diagnosis Date   • Diabetes mellitus (CMS/HCC) 11/13/2020   • HTN (hypertension) 11/13/2020   • Hypercholesterolemia 11/13/2020   • Hypothyroidism 11/13/2020   • Takotsubo cardiomyopathy 11/13/2020       Surgical History:      Past Surgical History:   Procedure Laterality Date   • BACK SURGERY     • CHOLECYSTECTOMY     • LAPAROSCOPIC TUBAL LIGATION     • THROAT SURGERY             Family History: family history includes Heart disease in her father. Otherwise pertinent FHx was reviewed and unremarkable.     Social History:  reports that she has never smoked. She has never used smokeless tobacco. She reports previous alcohol use. She reports that she does not use drugs.      Medications:  Prior to Admission medications    Medication Sig Start Date End Date Taking? Authorizing Provider   atorvastatin (LIPITOR) 40 MG tablet Take 40 mg by mouth Daily.   Yes ProviderAzul MD   levothyroxine (SYNTHROID, LEVOTHROID) 137 MCG tablet Take 137 mcg by mouth Daily.   Yes ProviderAzul MD   metFORMIN ER (GLUCOPHAGE-XR) 500 MG 24 hr tablet Take 1,000 mg by mouth 2 (two) times a day.   Yes ProviderAzul MD   metoprolol succinate XL (TOPROL-XL) 50 MG 24 hr tablet Take 50 mg by mouth Daily.   Yes ProviderAzul MD   sertraline (ZOLOFT) 25 MG tablet Take 50 mg by mouth Daily As Needed (Anx.).   Yes ProviderAzul MD   cyclobenzaprine (FLEXERIL) 5 MG tablet Take 5 mg by mouth 3 (Three) Times a Day As Needed for Muscle Spasms.    ProviderAzul MD   meloxicam (Mobic) 15 MG tablet Take 1 tablet by mouth Daily. Prn pain 7/24/20 11/13/20  William Friedman MD       Allergies:    Allergies   Allergen Reactions   • Lisinopril Anaphylaxis   • Naproxen Anaphylaxis   • Nifedipine Anaphylaxis   • Sulfa Antibiotics GI Intolerance       Objective   Objective     Vital Signs  Temp:  [97.5 °F (36.4 °C)] 97.5 °F (36.4 °C)  Heart Rate:  [73-88] 75  Resp:  [10-14] 10  BP:  (110-165)/() 119/86  SpO2:  [90 %-100 %] 95 %  on  Flow (L/min):  [2-4] 4;   Device (Oxygen Therapy): room air  Body mass index is 34.17 kg/m².    Physical Exam  Vitals signs reviewed.   Constitutional:       Appearance: Normal appearance. She is obese.   HENT:      Head: Normocephalic and atraumatic.      Nose: Nose normal.      Mouth/Throat:      Mouth: Mucous membranes are moist.      Pharynx: Oropharynx is clear.   Eyes:      Extraocular Movements: Extraocular movements intact.      Conjunctiva/sclera: Conjunctivae normal.      Pupils: Pupils are equal, round, and reactive to light.   Neck:      Musculoskeletal: Normal range of motion.   Cardiovascular:      Rate and Rhythm: Normal rate and regular rhythm.      Pulses: Normal pulses.      Heart sounds: Normal heart sounds.      Comments: S1, S2 audible  Pulmonary:      Effort: Pulmonary effort is normal.      Breath sounds: Normal breath sounds.      Comments: On room air   Abdominal:      General: Abdomen is flat. There is distension.      Palpations: Abdomen is soft.   Musculoskeletal: Normal range of motion.   Skin:     General: Skin is warm and dry.   Neurological:      General: No focal deficit present.      Mental Status: She is alert and oriented to person, place, and time. Mental status is at baseline.   Psychiatric:         Mood and Affect: Mood normal.         Behavior: Behavior normal.         Thought Content: Thought content normal.         Judgment: Judgment normal.         Results Review:  I have personally reviewed most recent cardiac tracings, lab results and radiology images and interpretations and agree with findings.    Results from last 7 days   Lab Units 11/13/20  1228   WBC 10*3/mm3 8.90   HEMOGLOBIN g/dL 12.9   HEMATOCRIT % 39.2   PLATELETS 10*3/mm3 318   INR  <0.93*     Results from last 7 days   Lab Units 11/13/20  1405 11/13/20  1228   SODIUM mmol/L  --  143   POTASSIUM mmol/L  --  4.1   CHLORIDE mmol/L  --  104   CO2 mmol/L  --   26.0   BUN mg/dL  --  14   CREATININE mg/dL  --  0.65   GLUCOSE mg/dL  --  248*   CALCIUM mg/dL  --  9.4   ALT (SGPT) U/L  --  15   AST (SGOT) U/L  --  17   TROPONIN T ng/mL 0.167* 0.137*     Estimated Creatinine Clearance: 100.1 mL/min (by C-G formula based on SCr of 0.65 mg/dL).  Brief Urine Lab Results     None          Microbiology Results (last 10 days)     Procedure Component Value - Date/Time    COVID PRE-OP / PRE-PROCEDURE SCREENING ORDER (NO ISOLATION) - Swab, Nasopharynx [[id number]]  (Normal) Collected: 11/13/20 1257    Lab Status: Final result Specimen: Swab from Nasopharynx Updated: 11/13/20 1345    Narrative:      The following orders were created for panel order COVID PRE-OP / PRE-PROCEDURE SCREENING ORDER (NO ISOLATION) - Swab, Nasopharynx.  Procedure                               Abnormality         Status                     ---------                               -----------         ------                     COVID-19,CEPHEID,COR/SAE...[[id number]]  Normal              Final result                 Please view results for these tests on the individual orders.    COVID-19,CEPHEID,COR/SAE/PAD IN-HOUSE(OR EMERGENT/ADD-ON),NP SWAB IN TRANSPORT MEDIA 3-4 HR TAT - Swab, Nasopharynx [[id number]]  (Normal) Collected: 11/13/20 1257    Lab Status: Final result Specimen: Swab from Nasopharynx Updated: 11/13/20 1345     COVID19 Not Detected    Narrative:      Fact sheet for providers: https://www.fda.gov/media/637483/download     Fact sheet for patients: https://www.fda.gov/media/685718/download          ECG/EMG Results (most recent)     Procedure Component Value Units Date/Time    ECG 12 Lead [[id number]] Collected: 11/13/20 1214     Updated: 11/13/20 1216     QT Interval 387 ms     Narrative:      HEART RATE= 87  bpm  RR Interval= 688  ms  MI Interval= 156  ms  P Horizontal Axis= -5  deg  P Front Axis= 34  deg  QRSD Interval= 102  ms  QT Interval= 387  ms  QRS Axis= -35  deg  T Wave Axis= 91  deg  - OTHERWISE  NORMAL ECG -  Sinus rhythm  Left axis deviation  No previous ECG available for comparison  Electronically Signed By:   Date and Time of Study: 2020-11-13 12:14:45    Adult Transthoracic Echo Complete W/ Cont if Necessary Per Protocol [[id number]] Resulted: 11/13/20 1627     Updated: 11/13/20 1627     Target HR (85%) 136 bpm      Max. Pred. HR (100%) 160 bpm           Results for orders placed during the hospital encounter of 07/24/20   Duplex Venous Lower Extremity - Left    Narrative · Normal left lower extremity venous duplex scan.               Xr Chest 1 View    Result Date: 11/13/2020  1. Focal opacity within the right cardiophrenic angle which may reflect atelectasis or infection.  Electronically Signed By-Wayne Poon MD On:11/13/2020 12:44 PM This report was finalized on [id number] by  Wayne Poon MD.        Estimated Creatinine Clearance: 100.1 mL/min (by C-G formula based on SCr of 0.65 mg/dL).    Assessment/Plan   Assessment/Plan       Active Hospital Problems    Diagnosis  POA   • **Takotsubo cardiomyopathy [I51.81]  Yes     Priority: High   • Chest pain [R07.9]  Yes     Priority: High   • Diabetes mellitus (CMS/HCC) [E11.9]  Yes   • Hypercholesterolemia [E78.00]  Yes   • Hypothyroidism [E03.9]  Yes   • HTN (hypertension) [I10]  Yes   • Obesity (BMI 30-39.9) [E66.9]  Yes      Resolved Hospital Problems   No resolved problems to display.     Chest pain secondary to takotsubo cardiomyopathy   - NSTEMI ruled out and patient found to have takotsubo cardiomyopathy after undergoing cath  - Troponin 0.13, 0.16- trend  - Continuous cardiac monitoring   - Metoprolol and hydralazine started per cardiology, patient has anaphylactic reaction to lisinopril   - 2D echo pending  - Cardiology following    Essential HTN  - Moderately controlled   - Monitor blood pressure  -Continue metoprolol and hydralazine     HLD  - Continue statin     DM Type II with hyperglylcemia  - Start sliding scale, Accuchecks   -  Check hbg A1C  -Holding home metformin    Hypothyroidism  - Continue levothyroxine  - Check TSH    Depression  - Stable  -Continue Zoloft    Obesity  - BMI 34.1   - Encourage lifestyle modifications             VTE Prophylaxis -   Mechanical Order History:     None      Pharmalogical Order History:      Ordered     Dose Route Frequency Stop    11/13/20 1239  heparin (porcine) 5000 UNIT/ML injection 4,000 Units      4,000 Units IV Once --    11/13/20 1240  heparin (porcine) 5000 UNIT/ML injection      -- IV Code / Trauma / Sedation Medication 11/13/20 1240                CODE STATUS:    Code Status and Medical Interventions:   Ordered at: 11/13/20 1344     Level Of Support Discussed With:    Patient     Code Status:    CPR     Medical Interventions (Level of Support Prior to Arrest):    Full       This patient has been examined wearing appropriate Personal Protective Equipment. 11/13/20      I discussed the patient's findings and my recommendations with patient.      Signature:Electronically signed by OTONIEL Oneill, 11/13/20, 5:05 PM EST.    [facility] Hospitalist Team

## 2020-11-14 LAB
ANION GAP SERPL CALCULATED.3IONS-SCNC: 10 MMOL/L (ref 5–15)
BH CV ECHO MEAS - % IVS THICK: 24.1 %
BH CV ECHO MEAS - % LVPW THICK: 5.2 %
BH CV ECHO MEAS - ACS: 1.8 CM
BH CV ECHO MEAS - AO MAX PG (FULL): 5.2 MMHG
BH CV ECHO MEAS - AO MAX PG: 8.3 MMHG
BH CV ECHO MEAS - AO MEAN PG (FULL): 2.7 MMHG
BH CV ECHO MEAS - AO MEAN PG: 4.3 MMHG
BH CV ECHO MEAS - AO ROOT AREA (BSA CORRECTED): 1.7
BH CV ECHO MEAS - AO ROOT AREA: 8.2 CM^2
BH CV ECHO MEAS - AO ROOT DIAM: 3.2 CM
BH CV ECHO MEAS - AO V2 MAX: 143.9 CM/SEC
BH CV ECHO MEAS - AO V2 MEAN: 97.8 CM/SEC
BH CV ECHO MEAS - AO V2 VTI: 27.4 CM
BH CV ECHO MEAS - AVA(I,A): 1.8 CM^2
BH CV ECHO MEAS - AVA(I,D): 1.8 CM^2
BH CV ECHO MEAS - AVA(V,A): 1.7 CM^2
BH CV ECHO MEAS - AVA(V,D): 1.7 CM^2
BH CV ECHO MEAS - BSA(HAYCOCK): 2.1 M^2
BH CV ECHO MEAS - BSA: 2 M^2
BH CV ECHO MEAS - BZI_BMI: 34.2 KILOGRAMS/M^2
BH CV ECHO MEAS - BZI_METRIC_HEIGHT: 162.6 CM
BH CV ECHO MEAS - BZI_METRIC_WEIGHT: 90.3 KG
BH CV ECHO MEAS - EDV(CUBED): 111.1 ML
BH CV ECHO MEAS - EDV(MOD-SP4): 79.5 ML
BH CV ECHO MEAS - EDV(TEICH): 107.9 ML
BH CV ECHO MEAS - EF(CUBED): 51 %
BH CV ECHO MEAS - EF(MOD-SP4): 53.9 %
BH CV ECHO MEAS - EF(TEICH): 42.9 %
BH CV ECHO MEAS - ESV(CUBED): 54.5 ML
BH CV ECHO MEAS - ESV(MOD-SP4): 36.6 ML
BH CV ECHO MEAS - ESV(TEICH): 61.6 ML
BH CV ECHO MEAS - FS: 21.2 %
BH CV ECHO MEAS - IVS/LVPW: 0.99
BH CV ECHO MEAS - IVSD: 1.3 CM
BH CV ECHO MEAS - IVSS: 1.6 CM
BH CV ECHO MEAS - LV DIASTOLIC VOL/BSA (35-75): 40.7 ML/M^2
BH CV ECHO MEAS - LV MASS(C)D: 235 GRAMS
BH CV ECHO MEAS - LV MASS(C)DI: 120.4 GRAMS/M^2
BH CV ECHO MEAS - LV MASS(C)S: 202.6 GRAMS
BH CV ECHO MEAS - LV MASS(C)SI: 103.8 GRAMS/M^2
BH CV ECHO MEAS - LV MAX PG: 3.1 MMHG
BH CV ECHO MEAS - LV MEAN PG: 1.7 MMHG
BH CV ECHO MEAS - LV SYSTOLIC VOL/BSA (12-30): 18.8 ML/M^2
BH CV ECHO MEAS - LV V1 MAX: 87.9 CM/SEC
BH CV ECHO MEAS - LV V1 MEAN: 60.9 CM/SEC
BH CV ECHO MEAS - LV V1 VTI: 17.5 CM
BH CV ECHO MEAS - LVIDD: 4.8 CM
BH CV ECHO MEAS - LVIDS: 3.8 CM
BH CV ECHO MEAS - LVOT AREA: 2.8 CM^2
BH CV ECHO MEAS - LVOT DIAM: 1.9 CM
BH CV ECHO MEAS - LVPWD: 1.3 CM
BH CV ECHO MEAS - LVPWS: 1.3 CM
BH CV ECHO MEAS - MV A MAX VEL: 117.4 CM/SEC
BH CV ECHO MEAS - MV DEC SLOPE: 442.8 CM/SEC^2
BH CV ECHO MEAS - MV DEC TIME: 0.2 SEC
BH CV ECHO MEAS - MV E MAX VEL: 88.5 CM/SEC
BH CV ECHO MEAS - MV E/A: 0.75
BH CV ECHO MEAS - MV MAX PG: 6.9 MMHG
BH CV ECHO MEAS - MV MEAN PG: 2.9 MMHG
BH CV ECHO MEAS - MV V2 MAX: 131.3 CM/SEC
BH CV ECHO MEAS - MV V2 MEAN: 79.8 CM/SEC
BH CV ECHO MEAS - MV V2 VTI: 30.4 CM
BH CV ECHO MEAS - MVA(VTI): 1.6 CM^2
BH CV ECHO MEAS - PA ACC TIME: 0.08 SEC
BH CV ECHO MEAS - PA MAX PG (FULL): 2.7 MMHG
BH CV ECHO MEAS - PA MAX PG: 4.3 MMHG
BH CV ECHO MEAS - PA MEAN PG (FULL): 1.5 MMHG
BH CV ECHO MEAS - PA MEAN PG: 2.3 MMHG
BH CV ECHO MEAS - PA PR(ACCEL): 44.7 MMHG
BH CV ECHO MEAS - PA V2 MAX: 103.4 CM/SEC
BH CV ECHO MEAS - PA V2 MEAN: 72.5 CM/SEC
BH CV ECHO MEAS - PA V2 VTI: 23.1 CM
BH CV ECHO MEAS - PI MAX PG: 0 MMHG
BH CV ECHO MEAS - PI MAX VEL: 0.7 CM/SEC
BH CV ECHO MEAS - RV MAX PG: 1.6 MMHG
BH CV ECHO MEAS - RV MEAN PG: 0.79 MMHG
BH CV ECHO MEAS - RV V1 MAX: 62.4 CM/SEC
BH CV ECHO MEAS - RV V1 MEAN: 41.8 CM/SEC
BH CV ECHO MEAS - RV V1 VTI: 16.6 CM
BH CV ECHO MEAS - RVDD: 2.6 CM
BH CV ECHO MEAS - SI(AO): 115.2 ML/M^2
BH CV ECHO MEAS - SI(CUBED): 29 ML/M^2
BH CV ECHO MEAS - SI(LVOT): 25 ML/M^2
BH CV ECHO MEAS - SI(MOD-SP4): 22 ML/M^2
BH CV ECHO MEAS - SI(TEICH): 23.7 ML/M^2
BH CV ECHO MEAS - SV(AO): 224.9 ML
BH CV ECHO MEAS - SV(CUBED): 56.7 ML
BH CV ECHO MEAS - SV(LVOT): 48.9 ML
BH CV ECHO MEAS - SV(MOD-SP4): 42.9 ML
BH CV ECHO MEAS - SV(TEICH): 46.3 ML
BUN SERPL-MCNC: 15 MG/DL (ref 8–23)
BUN/CREAT SERPL: 22.4 (ref 7–25)
CALCIUM SPEC-SCNC: 9 MG/DL (ref 8.6–10.5)
CHLORIDE SERPL-SCNC: 105 MMOL/L (ref 98–107)
CO2 SERPL-SCNC: 27 MMOL/L (ref 22–29)
CREAT SERPL-MCNC: 0.67 MG/DL (ref 0.57–1)
DEPRECATED RDW RBC AUTO: 42 FL (ref 37–54)
ERYTHROCYTE [DISTWIDTH] IN BLOOD BY AUTOMATED COUNT: 14.1 % (ref 12.3–15.4)
GFR SERPL CREATININE-BSD FRML MDRD: 90 ML/MIN/1.73
GLUCOSE BLDC GLUCOMTR-MCNC: 174 MG/DL (ref 70–105)
GLUCOSE BLDC GLUCOMTR-MCNC: 198 MG/DL (ref 70–105)
GLUCOSE BLDC GLUCOMTR-MCNC: 219 MG/DL (ref 70–105)
GLUCOSE BLDC GLUCOMTR-MCNC: 269 MG/DL (ref 70–105)
GLUCOSE SERPL-MCNC: 179 MG/DL (ref 65–99)
HCT VFR BLD AUTO: 38.7 % (ref 34–46.6)
HGB BLD-MCNC: 12.6 G/DL (ref 12–15.9)
MAXIMAL PREDICTED HEART RATE: 160 BPM
MCH RBC QN AUTO: 27.5 PG (ref 26.6–33)
MCHC RBC AUTO-ENTMCNC: 32.5 G/DL (ref 31.5–35.7)
MCV RBC AUTO: 84.7 FL (ref 79–97)
PLATELET # BLD AUTO: 316 10*3/MM3 (ref 140–450)
PMV BLD AUTO: 8.4 FL (ref 6–12)
POTASSIUM SERPL-SCNC: 4.2 MMOL/L (ref 3.5–5.2)
RBC # BLD AUTO: 4.56 10*6/MM3 (ref 3.77–5.28)
SODIUM SERPL-SCNC: 142 MMOL/L (ref 136–145)
STRESS TARGET HR: 136 BPM
TSH SERPL DL<=0.05 MIU/L-ACNC: 0.57 UIU/ML (ref 0.27–4.2)
WBC # BLD AUTO: 7.5 10*3/MM3 (ref 3.4–10.8)

## 2020-11-14 PROCEDURE — 84443 ASSAY THYROID STIM HORMONE: CPT | Performed by: NURSE PRACTITIONER

## 2020-11-14 PROCEDURE — G0378 HOSPITAL OBSERVATION PER HR: HCPCS

## 2020-11-14 PROCEDURE — 82962 GLUCOSE BLOOD TEST: CPT

## 2020-11-14 PROCEDURE — 63710000001 INSULIN LISPRO (HUMAN) PER 5 UNITS: Performed by: NURSE PRACTITIONER

## 2020-11-14 PROCEDURE — 99213 OFFICE O/P EST LOW 20 MIN: CPT | Performed by: INTERNAL MEDICINE

## 2020-11-14 PROCEDURE — 99225 PR SBSQ OBSERVATION CARE/DAY 25 MINUTES: CPT | Performed by: STUDENT IN AN ORGANIZED HEALTH CARE EDUCATION/TRAINING PROGRAM

## 2020-11-14 PROCEDURE — 85027 COMPLETE CBC AUTOMATED: CPT | Performed by: NURSE PRACTITIONER

## 2020-11-14 PROCEDURE — 80048 BASIC METABOLIC PNL TOTAL CA: CPT | Performed by: NURSE PRACTITIONER

## 2020-11-14 RX ORDER — SERTRALINE HYDROCHLORIDE 25 MG/1
25 TABLET, FILM COATED ORAL DAILY
Status: DISCONTINUED | OUTPATIENT
Start: 2020-11-14 | End: 2020-11-15 | Stop reason: HOSPADM

## 2020-11-14 RX ORDER — INSULIN GLARGINE 100 [IU]/ML
10 INJECTION, SOLUTION SUBCUTANEOUS DAILY
Status: DISCONTINUED | OUTPATIENT
Start: 2020-11-14 | End: 2020-11-15 | Stop reason: HOSPADM

## 2020-11-14 RX ADMIN — INSULIN LISPRO 2 UNITS: 100 INJECTION, SOLUTION INTRAVENOUS; SUBCUTANEOUS at 17:41

## 2020-11-14 RX ADMIN — METOPROLOL SUCCINATE 50 MG: 50 TABLET, EXTENDED RELEASE ORAL at 08:28

## 2020-11-14 RX ADMIN — HYDRALAZINE HYDROCHLORIDE 50 MG: 25 TABLET, FILM COATED ORAL at 20:09

## 2020-11-14 RX ADMIN — SERTRALINE 25 MG: 25 TABLET, FILM COATED ORAL at 17:41

## 2020-11-14 RX ADMIN — Medication 10 ML: at 08:28

## 2020-11-14 RX ADMIN — LEVOTHYROXINE SODIUM 137 MCG: 0.03 TABLET ORAL at 08:28

## 2020-11-14 RX ADMIN — ATORVASTATIN CALCIUM 40 MG: 40 TABLET, FILM COATED ORAL at 20:09

## 2020-11-14 RX ADMIN — HYDRALAZINE HYDROCHLORIDE 50 MG: 25 TABLET, FILM COATED ORAL at 08:28

## 2020-11-14 RX ADMIN — INSULIN LISPRO 6 UNITS: 100 INJECTION, SOLUTION INTRAVENOUS; SUBCUTANEOUS at 12:01

## 2020-11-14 NOTE — PLAN OF CARE
Problem: Adult Inpatient Plan of Care  Goal: Plan of Care Review  Outcome: Ongoing, Progressing  Flowsheets  Taken 11/14/2020 1055 by Rin Upton RN  Outcome Summary: Pt had heart cath yesterday. No intervention done. Waiting on echo results. Plan to restart zoloft today. Pt refused sliding scale insulin this morning. No distress noted. Will continue to monitor.  Taken 11/13/2020 1335 by Jen Barreto RN  Plan of Care Reviewed With: patient   Goal Outcome Evaluation:  Plan of Care Reviewed With: patient  Progress: improving  Outcome Summary: Pt had heart cath yesterday. No intervention done. Waiting on echo results. Plan to restart zoloft today. Pt refused sliding scale insulin this morning. No distress noted. Will continue to monitor.

## 2020-11-14 NOTE — PLAN OF CARE
Goal Outcome Evaluation:  Plan of Care Reviewed With: patient  Progress: improving  Outcome Summary: vss. off nitro gtt with no c/o c/p. will continue to monitor

## 2020-11-14 NOTE — PROGRESS NOTES
[facility] Medicine Services Daily Progress Note      Hospitalist Team  LOS 0 days      Patient Care Team:  Sandi Allen APRN as PCP - General (Nurse Practitioner)    Patient Location: 2114/1      Subjective   Subjective     Chief Complaint / Subjective  Chief Complaint   Patient presents with   • Chest Pain         Brief Synopsis of Hospital Course/HPI  Ms. Barnes is a 60 y.o. hypertension, hyperlipidemia, hypothyroidism, and diabetes mellitus who presented to [facility] on 11/13/2020 with complaints of chest pain.  Patient explains that she went to work today and began to develop chest pain.  She explained that it felt like something was very heavy on her chest.  She reports this never happened before.  Everything made her chest pain worse and she denies any alleviating factors.  She did explain last night she did have dizziness.  She denied any recent heart palpitations, nausea, vomiting, diarrhea, fever, chills, cough, or shortness of breath.     In the ED, chest x-ray showed Focal opacity within the right cardiophrenic angle which may reflect  atelectasis or infection.  EKG showed sinus rhythm.  All labs are unremarkable upon admission except troponin 0.13, 0.16.     Patient was taken to Cath Lab shortly after arrival to the ED due to elevated troponins and abnormal EKG showing ST segment elevation in 1 and aVL consistent with STEMI . Patient was found to have Takotsubo  Cardiomyopathy.  Dr. Ambrosio contacted the hospitalist to admit patient for further care management.      Date::    11/14/2020  Patient is weepy, states two daughter recently walked out.  Chest pain improved.  Awaiting cardiology recommendations.      ROS  Constitution: Negative.   HENT: Negative.    Cardiovascular: Positive for chest pain.   Respiratory: Negative.    Skin: Negative.    Musculoskeletal: Negative.    Gastrointestinal: Negative.    Genitourinary: Negative.    Neurological: Positive for  "dizziness.   Psychiatric/Behavioral: Negative.      Objective   Objective      Vital Signs  Temp:  [97.6 °F (36.4 °C)-98.2 °F (36.8 °C)] 98 °F (36.7 °C)  Heart Rate:  [70-89] 83  Resp:  [14-16] 16  BP: (110-153)/(65-95) 138/68  Oxygen Therapy  SpO2: 96 %  Pulse Oximetry Type: Intermittent  Device (Oxygen Therapy): room air  Flow (L/min): 4  Flowsheet Rows      First Filed Value   Admission Height  162.6 cm (64\") Documented at 11/13/2020 1205   Admission Weight  90.3 kg (199 lb 1.2 oz) Documented at 11/13/2020 1205        Intake & Output (last 3 days)       11/11 0701 - 11/12 0700 11/12 0701 - 11/13 0700 11/13 0701 - 11/14 0700 11/14 0701 - 11/15 0700    P.O.   540 480    Total Intake(mL/kg)   540 (5.7) 480 (5)    Net   +540 +480                Lines, Drains & Airways    Active LDAs     Name:   Placement date:   Placement time:   Site:   Days:    Peripheral IV 11/13/20 1235 Left Antecubital   11/13/20    1235    Antecubital   1                  Physical Exam:    Physical Exam  Constitutional:       Appearance: Normal appearance. She is obese.   HENT:      Head: Normocephalic and atraumatic.      Nose: Nose normal.      Mouth/Throat:      Mouth: Mucous membranes are moist.      Pharynx: Oropharynx is clear.   Eyes:      Extraocular Movements: Extraocular movements intact.      Conjunctiva/sclera: Conjunctivae normal.      Pupils: Pupils are equal, round, and reactive to light.   Neck:      Musculoskeletal: Normal range of motion.   Cardiovascular:      Rate and Rhythm: Normal rate and regular rhythm.      Pulses: Normal pulses.      Heart sounds: Normal heart sounds.      Comments: S1, S2 audible  Pulmonary:      Effort: Pulmonary effort is normal.      Breath sounds: Normal breath sounds.      Comments: On room air   Abdominal:      General: Abdomen is flat. There is distension.      Palpations: Abdomen is soft.   Musculoskeletal: Normal range of motion.   Skin:     General: Skin is warm and dry.   Neurological:      " General: No focal deficit present.      Mental Status: She is alert and oriented to person, place, and time. Mental status is at baseline.   Psychiatric:         Mood and Affect: Mood normal.   Patient is weepy     Behavior: Behavior normal.         Thought Content: Thought content normal.         Judgment: Judgment normal.            Procedures:    Procedure(s):  Left Heart Cath  Left ventriculography  Coronary angiography          Results Review:     I reviewed the patient's new clinical results.      Lab Results (last 24 hours)     Procedure Component Value Units Date/Time    POC Glucose Once [[id number]]  (Abnormal) Collected: 11/14/20 1108    Specimen: Blood Updated: 11/14/20 1110     Glucose 269 mg/dL      Comment: Serial Number: [id number]:  [id number]       POC Glucose Once [[id number]]  (Abnormal) Collected: 11/14/20 0726    Specimen: Blood Updated: 11/14/20 0727     Glucose 174 mg/dL      Comment: Serial Number: [id number]:  [id number]       TSH [[id number]]  (Normal) Collected: 11/14/20 0246    Specimen: Blood Updated: 11/14/20 0326     TSH 0.566 uIU/mL     Basic Metabolic Panel [[id number]]  (Abnormal) Collected: 11/14/20 0246    Specimen: Blood Updated: 11/14/20 0324     Glucose 179 mg/dL      BUN 15 mg/dL      Creatinine 0.67 mg/dL      Sodium 142 mmol/L      Potassium 4.2 mmol/L      Chloride 105 mmol/L      CO2 27.0 mmol/L      Calcium 9.0 mg/dL      eGFR Non African Amer 90 mL/min/1.73      BUN/Creatinine Ratio 22.4     Anion Gap 10.0 mmol/L     Narrative:      GFR Normal >60  Chronic Kidney Disease <60  Kidney Failure <15      CBC (No Diff) [[id number]]  (Normal) Collected: 11/14/20 0246    Specimen: Blood Updated: 11/14/20 0258     WBC 7.50 10*3/mm3      RBC 4.56 10*6/mm3      Hemoglobin 12.6 g/dL      Hematocrit 38.7 %      MCV 84.7 fL      MCH 27.5 pg      MCHC 32.5 g/dL      RDW 14.1 %      RDW-SD 42.0 fl      MPV 8.4 fL      Platelets 316 10*3/mm3     POC Glucose Once [[id number]]   (Abnormal) Collected: 11/13/20 2021    Specimen: Blood Updated: 11/13/20 2022     Glucose 226 mg/dL      Comment: Serial Number: [id number]:  [id number]       POC Activated Clotting Time [[id number]]  (Abnormal) Collected: 11/13/20 1319    Specimen: Arterial Blood Updated: 11/13/20 1752     Activated Clotting Time  153 Seconds      Comment: Serial Number: [id number]:  [id number]       POC Glucose Once [[id number]]  (Abnormal) Collected: 11/13/20 1749    Specimen: Blood Updated: 11/13/20 1750     Glucose 214 mg/dL      Comment: Serial Number: [id number]:  [id number]       Hemoglobin A1c [[id number]]  (Abnormal) Collected: 11/13/20 1228    Specimen: Blood Updated: 11/13/20 1717     Hemoglobin A1C 8.9 %     Narrative:      Hemoglobin A1C Reference Range:    <5.7 %        Normal  5.7-6.4 %     Increased risk for diabetes  > 6.4 %        Diabetes       These guidelines have been recommended by the American Diabetic Association for Hgb A1c.      The following 2010 guidelines have been recommended by the American Diabetes Association for Hemoglobin A1c.    HBA1c 5.7-6.4% Increased risk for future diabetes (pre-diabetes)  HBA1c     >6.4% Diabetes      [place] Draw [[id number]] Collected: 11/13/20 1405    Specimen: Blood Updated: 11/13/20 1515    Narrative:      The following orders were created for panel order [place] Draw.  Procedure                               Abnormality         Status                     ---------                               -----------         ------                     Light Blue Top[[id number]]                                   Final result               Green Top (Gel)[[id number]]                                  Final result               Lavender Top[[id number]]                                     Final result               Gold Top - SST[[id number]]                                   Final result                 Please view results for these tests on the individual orders.    Light Blue  Top [[id number]] Collected: 11/13/20 1405    Specimen: Blood Updated: 11/13/20 1515     Extra Tube hold for add-on     Comment: Auto resulted       Green Top (Gel) [[id number]] Collected: 11/13/20 1405    Specimen: Blood Updated: 11/13/20 1515     Extra Tube Hold for add-ons.     Comment: Auto resulted.       Lavender Top [[id number]] Collected: 11/13/20 1405    Specimen: Blood Updated: 11/13/20 1515     Extra Tube hold for add-on     Comment: Auto resulted       Gold Top - SST [[id number]] Collected: 11/13/20 1405    Specimen: Blood Updated: 11/13/20 1515     Extra Tube Hold for add-ons.     Comment: Auto resulted.       POC Glucose Once [[id number]]  (Abnormal) Collected: 11/13/20 1507    Specimen: Blood Updated: 11/13/20 1508     Glucose 209 mg/dL      Comment: Serial Number: [id number]:  [id number]           Hemoglobin A1C   Date Value Ref Range Status   11/13/2020 8.9 (H) 3.5 - 5.6 % Final     Results from last 7 days   Lab Units 11/13/20  1228   INR  <0.93*           No results found for: LIPASE  No results found for: CHOL, CHLPL, TRIG, HDL, LDL, LDLDIRECT    No results found for: INTRAOP, PREDX, FINALDX, COMDX    Microbiology Results (last 10 days)     Procedure Component Value - Date/Time    COVID PRE-OP / PRE-PROCEDURE SCREENING ORDER (NO ISOLATION) - Swab, Nasopharynx [[id number]]  (Normal) Collected: 11/13/20 1257    Lab Status: Final result Specimen: Swab from Nasopharynx Updated: 11/13/20 1345    Narrative:      The following orders were created for panel order COVID PRE-OP / PRE-PROCEDURE SCREENING ORDER (NO ISOLATION) - Swab, Nasopharynx.  Procedure                               Abnormality         Status                     ---------                               -----------         ------                     COVID-19,CEPHEID,COR/SAE...[[id number]]  Normal              Final result                 Please view results for these tests on the individual orders.    COVID-19,CEPHEID,COR/SAE/PAD IN-HOUSE(OR  EMERGENT/ADD-ON),NP SWAB IN TRANSPORT MEDIA 3-4 HR TAT - Swab, Nasopharynx [[id number]]  (Normal) Collected: 11/13/20 1257    Lab Status: Final result Specimen: Swab from Nasopharynx Updated: 11/13/20 1345     COVID19 Not Detected    Narrative:      Fact sheet for providers: https://www.fda.gov/media/746870/download     Fact sheet for patients: https://www.fda.gov/media/502181/download          ECG/EMG Results (most recent)     Procedure Component Value Units Date/Time    ECG 12 Lead [[id number]] Collected: 11/13/20 1214     Updated: 11/13/20 1216     QT Interval 387 ms     Narrative:      HEART RATE= 87  bpm  RR Interval= 688  ms  GA Interval= 156  ms  P Horizontal Axis= -5  deg  P Front Axis= 34  deg  QRSD Interval= 102  ms  QT Interval= 387  ms  QRS Axis= -35  deg  T Wave Axis= 91  deg  - OTHERWISE NORMAL ECG -  Sinus rhythm  Left axis deviation  No previous ECG available for comparison  Electronically Signed By:   Date and Time of Study: 2020-11-13 12:14:45    Adult Transthoracic Echo Complete W/ Cont if Necessary Per Protocol [[id number]] Collected: 11/13/20 1547     Updated: 11/14/20 1439     BSA 2.0 m^2      RVIDd 2.6 cm      IVSd 1.3 cm      IVSs 1.6 cm      LVIDd 4.8 cm      LVIDs 3.8 cm      LVPWd 1.3 cm      BH CV ECHO TUYET - LVPWS 1.3 cm      IVS/LVPW 0.99     FS 21.2 %      EDV(Teich) 107.9 ml      ESV(Teich) 61.6 ml      EF(Teich) 42.9 %      EDV(cubed) 111.1 ml      ESV(cubed) 54.5 ml      EF(cubed) 51.0 %      % IVS thick 24.1 %      % LVPW thick 5.2 %      LV mass(C)d 235.0 grams      LV mass(C)dI 120.4 grams/m^2      LV mass(C)s 202.6 grams      LV mass(C)sI 103.8 grams/m^2      SV(Teich) 46.3 ml      SI(Teich) 23.7 ml/m^2      SV(cubed) 56.7 ml      SI(cubed) 29.0 ml/m^2      Ao root diam 3.2 cm      Ao root area 8.2 cm^2      ACS 1.8 cm      LVOT diam 1.9 cm      LVOT area 2.8 cm^2      EDV(MOD-sp4) 79.5 ml      ESV(MOD-sp4) 36.6 ml      EF(MOD-sp4) 53.9 %      SV(MOD-sp4) 42.9 ml      SI(MOD-sp4)  22.0 ml/m^2      Ao root area (BSA corrected) 1.7     LV [place] Vol (BSA corrected) 40.7 ml/m^2      LV Sys Vol (BSA corrected) 18.8 ml/m^2      MV E max isaura 88.5 cm/sec      MV A max isaura 117.4 cm/sec      MV E/A 0.75     MV V2 max 131.3 cm/sec      MV max PG 6.9 mmHg      MV V2 mean 79.8 cm/sec      MV mean PG 2.9 mmHg      MV V2 VTI 30.4 cm      MVA(VTI) 1.6 cm^2      MV dec slope 442.8 cm/sec^2      MV dec time 0.2 sec      Ao pk isaura 143.9 cm/sec      Ao max PG 8.3 mmHg      Ao max PG (full) 5.2 mmHg      Ao V2 mean 97.8 cm/sec      Ao mean PG 4.3 mmHg      Ao mean PG (full) 2.7 mmHg      Ao V2 VTI 27.4 cm      SOFYA(I,A) 1.8 cm^2      SOFYA(I,D) 1.8 cm^2      SOFYA(V,A) 1.7 cm^2      SOFYA(V,D) 1.7 cm^2      LV V1 max PG 3.1 mmHg      LV V1 mean PG 1.7 mmHg      LV V1 max 87.9 cm/sec      LV V1 mean 60.9 cm/sec      LV V1 VTI 17.5 cm      SV(Ao) 224.9 ml      SI(Ao) 115.2 ml/m^2      SV(LVOT) 48.9 ml      SI(LVOT) 25.0 ml/m^2      PA V2 max 103.4 cm/sec      PA max PG 4.3 mmHg      PA max PG (full) 2.7 mmHg      PA V2 mean 72.5 cm/sec      PA mean PG 2.3 mmHg      PA mean PG (full) 1.5 mmHg      PA V2 VTI 23.1 cm      PA acc time 0.08 sec      PI max isaura 0.7 cm/sec      PI max PG 0.0 mmHg      RV V1 max PG 1.6 mmHg      RV V1 mean PG 0.79 mmHg      RV V1 max 62.4 cm/sec      RV V1 mean 41.8 cm/sec      RV V1 VTI 16.6 cm      PA pr(Accel) 44.7 mmHg       CV ECHO TUYET - BZI_BMI 34.2 kilograms/m^2       CV ECHO TUYET - BSA(HAYCOCK) 2.1 m^2      BH CV ECHO TUYET - BZI_METRIC_WEIGHT 90.3 kg      BH CV ECHO TUYET - BZI_METRIC_HEIGHT 162.6 cm      Target HR (85%) 136 bpm      Max. Pred. HR (100%) 160 bpm     Narrative:      · Left ventricular systolic function is moderately decreased.  · Left ventricular ejection fraction is 35 to 40%  · Akinetic distal anterior wall apex and distal septum and inferior wall   noted. Basal segment for hypercontractile. This is consistent with   Takotsubo syndrome  · Left ventricular diastolic  function is consistent with (grade I)   impaired relaxation.  · Left ventricular wall thickness is consistent with concentric   hypertrophy.             Results for orders placed during the hospital encounter of 07/24/20   Duplex Venous Lower Extremity - Left    Narrative · Normal left lower extremity venous duplex scan.          Results for orders placed during the hospital encounter of 11/13/20   Adult Transthoracic Echo Complete W/ Cont if Necessary Per Protocol    Narrative · Left ventricular systolic function is moderately decreased.  · Left ventricular ejection fraction is 35 to 40%  · Akinetic distal anterior wall apex and distal septum and inferior wall   noted. Basal segment for hypercontractile. This is consistent with   Takotsubo syndrome  · Left ventricular diastolic function is consistent with (grade I)   impaired relaxation.  · Left ventricular wall thickness is consistent with concentric   hypertrophy.          Xr Chest 1 View    Result Date: 11/13/2020  1. Focal opacity within the right cardiophrenic angle which may reflect atelectasis or infection.  Electronically Signed By-Wayne Poon MD On:11/13/2020 12:44 PM This report was finalized on [id number] by  Wayne Poon MD.          Xrays, labs reviewed personally by physician.    Medication Review:   I have reviewed the patient's current medication list      Scheduled Meds  atorvastatin, 40 mg, Oral, Nightly  heparin (porcine), 4,000 Units, Intravenous, Once  hydrALAZINE, 50 mg, Oral, Q12H  insulin glargine, 10 Units, Subcutaneous, Daily  insulin lispro, 0-9 Units, Subcutaneous, TID AC  levothyroxine, 137 mcg, Oral, Daily  metoprolol succinate XL, 50 mg, Oral, Daily  sertraline, 25 mg, Oral, Daily        Meds Infusions  nitroglycerin, 10-50 mcg/min, Last Rate: Stopped (11/13/20 1840)  sodium chloride, 75 mL/hr        Meds PRN  •  acetaminophen  •  atropine  •  cyclobenzaprine  •  dextrose  •  dextrose  •  glucagon (human recombinant)  •   influenza vaccine  •  insulin lispro **AND** insulin lispro  •  sertraline  •  sodium chloride  •  sodium chloride        Assessment/Plan   Assessment/Plan     Active Hospital Problems    Diagnosis  POA   • **Takotsubo cardiomyopathy [I51.81]  Yes   • Diabetes mellitus (CMS/HCC) [E11.9]  Yes   • Hypercholesterolemia [E78.00]  Yes   • Hypothyroidism [E03.9]  Yes   • HTN (hypertension) [I10]  Yes   • Obesity (BMI 30-39.9) [E66.9]  Yes   • Chest pain [R07.9]  Yes      Resolved Hospital Problems   No resolved problems to display.       MEDICAL DECISION MAKING COMPLEXITY BY PROBLEM:     #Chest pain   #takotsubo cardiomyopathy   - NSTEMI ruled out and patient found to have takotsubo cardiomyopathy after undergoing cath  - Troponin 0.13, 0.16- trend  - Continuous cardiac monitoring   - Metoprolol and hydralazine started per cardiology, patient has anaphylactic reaction to lisinopril   - 2D echo: EF 35-40%, akinesis consistent with Takotsubo, impaired relaxation  - Cardiology following     Essential HTN  - Moderately controlled   - Monitor blood pressure  -Continue metoprolol and hydralazine      HLD  - Continue statin      DM Type II with hyperglylcemia  - Start sliding scale, Accuchecks   - A1C is 8.9  -start lantus 10u SC daily     Hypothyroidism  - Continue levothyroxine  - Check TSH: 0.566     Depression  - Stable  -Continue Zoloft     Obesity  - BMI 34.1   - Encourage lifestyle modifications     VTE Prophylaxis -   Mechanical Order History:     None      Pharmalogical Order History:      Ordered     Dose Route Frequency Stop    11/13/20 1239  heparin (porcine) 5000 UNIT/ML injection 4,000 Units      4,000 Units IV Once --    11/13/20 1240  heparin (porcine) 5000 UNIT/ML injection      -- IV Code / Trauma / Sedation Medication 11/13/20 1240                  Code Status -   Code Status and Medical Interventions:   Ordered at: 11/13/20 1736     Limited Support to NOT Include:    Intubation     Level Of Support Discussed  With:    Patient     Code Status:    No CPR     Medical Interventions (Level of Support Prior to Arrest):    Limited       This patient has been examined wearing appropriate Personal Protective Equipment and discussed with Patient. 11/14/20        Discharge Planning  Home          Electronically signed by Tapan Nicole DO, 11/14/20, 14:46 EST.  [facility] Hospitalist Team

## 2020-11-14 NOTE — PROGRESS NOTES
CARDIOLOGY FOLLOW-UP PROGRESS NOTE      Reason for follow-up:    Chest Pain  SOA  Takotsubo cardiomyopathy     Attending: Tapan Nicole DO Subjective .     No chest pain or dyspnea     Review of Systems   Constitution: Negative for decreased appetite and diaphoresis.   HENT: Negative for congestion, hearing loss and nosebleeds.    Cardiovascular: Negative for chest pain, claudication, dyspnea on exertion, irregular heartbeat, leg swelling, near-syncope, orthopnea, palpitations, paroxysmal nocturnal dyspnea and syncope.   Respiratory: Negative for cough, shortness of breath and sleep disturbances due to breathing.    Endocrine: Negative for polyuria.   Hematologic/Lymphatic: Does not bruise/bleed easily.   Skin: Negative for itching and rash.   Musculoskeletal: Negative for back pain, muscle weakness and myalgias.   Gastrointestinal: Negative for abdominal pain, change in bowel habit and nausea.   Genitourinary: Negative for dysuria, flank pain, frequency and hesitancy.   Neurological: Negative for dizziness, tremors and weakness.   Psychiatric/Behavioral: Positive for depression. Negative for altered mental status. The patient is nervous/anxious. The patient does not have insomnia.        Allergies: Lisinopril, Naproxen, Nifedipine, and Sulfa antibiotics    Scheduled Meds:atorvastatin, 40 mg, Oral, Nightly  heparin (porcine), 4,000 Units, Intravenous, Once  hydrALAZINE, 50 mg, Oral, Q12H  insulin lispro, 0-9 Units, Subcutaneous, TID AC  levothyroxine, 137 mcg, Oral, Daily  metoprolol succinate XL, 50 mg, Oral, Daily        Continuous Infusions:nitroglycerin, 10-50 mcg/min, Last Rate: Stopped (11/13/20 1840)  sodium chloride, 75 mL/hr        PRN Meds:.•  acetaminophen  •  atropine  •  cyclobenzaprine  •  dextrose  •  dextrose  •  glucagon (human recombinant)  •  influenza vaccine  •  insulin lispro **AND** insulin lispro  •  sertraline  •  sodium chloride  •  sodium chloride    Objective     VITAL  "SIGNS  Patient Vitals for the past 24 hrs:   BP Temp Temp src Pulse Resp SpO2 Height Weight   11/14/20 1014 138/68 98 °F (36.7 °C) Oral 83 16 96 % -- --   11/14/20 0828 124/65 -- -- 89 -- -- -- --   11/14/20 0633 149/80 98.2 °F (36.8 °C) -- 79 16 -- -- --   11/14/20 0500 -- -- -- -- -- -- -- 95.4 kg (210 lb 5.1 oz)   11/14/20 0316 142/80 97.8 °F (36.6 °C) -- 84 14 -- -- --   11/13/20 2312 139/82 97.6 °F (36.4 °C) -- 70 16 93 % -- --   11/13/20 2044 134/75 -- -- -- -- -- -- --   11/13/20 1811 110/76 -- -- -- -- -- 162.6 cm (64\") 90.3 kg (199 lb)   11/13/20 1736 119/77 97.8 °F (36.6 °C) -- 73 16 -- 162.6 cm (64.02\") 90.3 kg (199 lb 1.2 oz)   11/13/20 1730 119/77 -- -- 76 -- 97 % -- --   11/13/20 1702 115/68 -- -- 80 -- 97 % -- --   11/13/20 1645 117/72 -- -- 77 -- 97 % -- --   11/13/20 1630 110/81 -- -- 81 -- 96 % -- --   11/13/20 1615 121/76 -- -- 73 -- 94 % -- --   11/13/20 1601 119/86 -- -- 75 -- 95 % -- --   11/13/20 1552 110/76 -- -- 79 -- 96 % -- --   11/13/20 1530 143/90 -- -- 75 -- 93 % -- --   11/13/20 1515 136/95 -- -- 73 -- 100 % -- --   11/13/20 1450 153/94 -- -- 77 -- 100 % -- --   11/13/20 1435 147/99 -- -- 75 -- 100 % -- --        Flowsheet Rows      First Filed Value   Admission Height  162.6 cm (64\") Documented at 11/13/2020 1205   Admission Weight  90.3 kg (199 lb 1.2 oz) Documented at 11/13/2020 1205            Intake/Output Summary (Last 24 hours) at 11/14/2020 1433  Last data filed at 11/14/2020 0828  Gross per 24 hour   Intake 1020 ml   Output --   Net 1020 ml        TELEMETRY:     SR    Physical Exam:  Constitutional:       General: Not in acute distress.     Appearance: Normal appearance. Well-developed.   Eyes:      Pupils: Pupils are equal, round, and reactive to light.   HENT:      Head: Normocephalic and atraumatic.   Neck:      Musculoskeletal: Normal range of motion and neck supple.      Vascular: No JVD.   Pulmonary:      Effort: Pulmonary effort is normal.      Breath sounds: Normal " breath sounds.   Cardiovascular:      Normal rate. Regular rhythm.   Pulses:     Intact distal pulses.   Edema:     Peripheral edema absent.   Abdominal:      General: There is no distension.      Palpations: Abdomen is soft.      Tenderness: There is no abdominal tenderness.   Musculoskeletal: Normal range of motion.   Skin:     General: Skin is warm and dry.   Neurological:      Mental Status: Alert and oriented to person, place, and time.            Results Review:   I reviewed the patient's new clinical results.    CBC    Results from last 7 days   Lab Units 11/14/20  0246 11/13/20  1228   WBC 10*3/mm3 7.50 8.90   HEMOGLOBIN g/dL 12.6 12.9   PLATELETS 10*3/mm3 316 318     BMP   Results from last 7 days   Lab Units 11/14/20  0246 11/13/20  1228   SODIUM mmol/L 142 143   POTASSIUM mmol/L 4.2 4.1   CHLORIDE mmol/L 105 104   CO2 mmol/L 27.0 26.0   BUN mg/dL 15 14   CREATININE mg/dL 0.67 0.65   GLUCOSE mg/dL 179* 248*   MAGNESIUM mg/dL  --  1.8     Cr Clearance Estimated Creatinine Clearance: 100.1 mL/min (by C-G formula based on SCr of 0.67 mg/dL).  Coag   Results from last 7 days   Lab Units 11/13/20  1228   INR  <0.93*   APTT seconds 23.6*     HbA1C   Lab Results   Component Value Date    HGBA1C 8.9 (H) 11/13/2020     Blood Glucose   Glucose   Date/Time Value Ref Range Status   11/14/2020 1108 269 (H) 70 - 105 mg/dL Final     Comment:     Serial Number: [id number]:  [id number]   11/14/2020 0726 174 (H) 70 - 105 mg/dL Final     Comment:     Serial Number: [id number]:  [id number]   11/13/2020 2021 226 (H) 70 - 105 mg/dL Final     Comment:     Serial Number: [id number]:  [id number]   11/13/2020 1749 214 (H) 70 - 105 mg/dL Final     Comment:     Serial Number: [id number]:  [id number]   11/13/2020 1507 209 (H) 70 - 105 mg/dL Final     Comment:     Serial Number: [id number]:  [id number]     Infection     CMP   Results from last 7 days   Lab Units 11/14/20  0246 11/13/20  1228   SODIUM  mmol/L 142 143   POTASSIUM mmol/L 4.2 4.1   CHLORIDE mmol/L 105 104   CO2 mmol/L 27.0 26.0   BUN mg/dL 15 14   CREATININE mg/dL 0.67 0.65   GLUCOSE mg/dL 179* 248*   ALBUMIN g/dL  --  4.30   BILIRUBIN mg/dL  --  0.6   ALK PHOS U/L  --  108   AST (SGOT) U/L  --  17   ALT (SGPT) U/L  --  15     ABG      UA      YONY  No results found for: POCMETH, POCAMPHET, POCBARBITUR, POCBENZO, POCCOCAINE, POCOPIATES, POCOXYCODO, POCPHENCYC, POCPROPOXY, POCTHC, POCTRICYC  Lysis Labs   Results from last 7 days   Lab Units 11/14/20  0246 11/13/20  1228   INR   --  <0.93*   APTT seconds  --  23.6*   HEMOGLOBIN g/dL 12.6 12.9   PLATELETS 10*3/mm3 316 318   CREATININE mg/dL 0.67 0.65     Radiology(recent) Xr Chest 1 View    Result Date: 11/13/2020  1. Focal opacity within the right cardiophrenic angle which may reflect atelectasis or infection.  Electronically Signed By-Wayne Poon MD On:11/13/2020 12:44 PM This report was finalized on [id number] by  Wayne Poon MD.      Imaging Results (Last 24 Hours)     ** No results found for the last 24 hours. **          Results from last 7 days   Lab Units 11/13/20  1405   TROPONIN T ng/mL 0.167*       EKG              I personally viewed and interpreted the patient's EKG/Telemetry data:        ECHOCARDIOGRAM:            STRESS MYOVIEW:      CARDIAC CATHETERIZATION:      OTHER:         Assessment/Plan            Takotsubo cardiomyopathy    Diabetes mellitus (CMS/HCC)    Hypercholesterolemia    Hypothyroidism    HTN (hypertension)    Obesity (BMI 30-39.9)    Chest pain        ASSESSMENT:    Chest pain with ST segment abnormality consistent with a STEMI  Diabetes  Hypertension  Hyperlipidemia          PLAN:  Admitted with chest pain and had ST segment elevation in 1 and aVL consistent with STEMI  Patient is taken to cardiac catheterization laboratory and had a cardiac catheterization which showed normal coronaries and LV dysfunction consistent Takotsubo cardiomyopathy       Dr. Sullivan to  review echocardiogram   Continue Medical Rx        Additional recommendations per Dr. Sullivan    I discussed the patients findings and my recommendations with patient and RN    Patient is seen and examined and findings are verified.  Patient is emotionally very unstable at this stage.  She has her 2 daughters which left home against her will.  She is crying.  Patient denies any chest pain.    Hemodynamics are stable    Normal S1 and S2.  No pericardial rub or murmur.  Chest is clear to auscultation    At this stage, I would recommend to continue current treatment.  Patient is on hydralazine and beta-blockers.  Echocardiogram would be reviewed.  I would start SSRI for her depression.  We shall follow                            Agustin Sullivan MD  11/14/20  14:33 EST

## 2020-11-15 VITALS
RESPIRATION RATE: 16 BRPM | TEMPERATURE: 98 F | HEIGHT: 64 IN | BODY MASS INDEX: 33.91 KG/M2 | WEIGHT: 198.63 LBS | HEART RATE: 80 BPM | DIASTOLIC BLOOD PRESSURE: 56 MMHG | SYSTOLIC BLOOD PRESSURE: 121 MMHG | OXYGEN SATURATION: 100 %

## 2020-11-15 LAB
ANION GAP SERPL CALCULATED.3IONS-SCNC: 11 MMOL/L (ref 5–15)
BUN SERPL-MCNC: 12 MG/DL (ref 8–23)
BUN/CREAT SERPL: 17.9 (ref 7–25)
CALCIUM SPEC-SCNC: 9.1 MG/DL (ref 8.6–10.5)
CHLORIDE SERPL-SCNC: 102 MMOL/L (ref 98–107)
CO2 SERPL-SCNC: 27 MMOL/L (ref 22–29)
CREAT SERPL-MCNC: 0.67 MG/DL (ref 0.57–1)
DEPRECATED RDW RBC AUTO: 41.1 FL (ref 37–54)
ERYTHROCYTE [DISTWIDTH] IN BLOOD BY AUTOMATED COUNT: 13.9 % (ref 12.3–15.4)
GFR SERPL CREATININE-BSD FRML MDRD: 90 ML/MIN/1.73
GLUCOSE BLDC GLUCOMTR-MCNC: 148 MG/DL (ref 70–105)
GLUCOSE BLDC GLUCOMTR-MCNC: 203 MG/DL (ref 70–105)
GLUCOSE BLDC GLUCOMTR-MCNC: 256 MG/DL (ref 70–105)
GLUCOSE SERPL-MCNC: 233 MG/DL (ref 65–99)
HCT VFR BLD AUTO: 40.2 % (ref 34–46.6)
HGB BLD-MCNC: 13.2 G/DL (ref 12–15.9)
MCH RBC QN AUTO: 27.5 PG (ref 26.6–33)
MCHC RBC AUTO-ENTMCNC: 32.8 G/DL (ref 31.5–35.7)
MCV RBC AUTO: 84.1 FL (ref 79–97)
PLATELET # BLD AUTO: 341 10*3/MM3 (ref 140–450)
PMV BLD AUTO: 8.9 FL (ref 6–12)
POTASSIUM SERPL-SCNC: 3.9 MMOL/L (ref 3.5–5.2)
RBC # BLD AUTO: 4.78 10*6/MM3 (ref 3.77–5.28)
SODIUM SERPL-SCNC: 140 MMOL/L (ref 136–145)
WBC # BLD AUTO: 8 10*3/MM3 (ref 3.4–10.8)

## 2020-11-15 PROCEDURE — 90686 IIV4 VACC NO PRSV 0.5 ML IM: CPT | Performed by: STUDENT IN AN ORGANIZED HEALTH CARE EDUCATION/TRAINING PROGRAM

## 2020-11-15 PROCEDURE — G0008 ADMIN INFLUENZA VIRUS VAC: HCPCS | Performed by: STUDENT IN AN ORGANIZED HEALTH CARE EDUCATION/TRAINING PROGRAM

## 2020-11-15 PROCEDURE — 63710000001 INSULIN GLARGINE PER 5 UNITS: Performed by: STUDENT IN AN ORGANIZED HEALTH CARE EDUCATION/TRAINING PROGRAM

## 2020-11-15 PROCEDURE — 85027 COMPLETE CBC AUTOMATED: CPT | Performed by: STUDENT IN AN ORGANIZED HEALTH CARE EDUCATION/TRAINING PROGRAM

## 2020-11-15 PROCEDURE — 99217 PR OBSERVATION CARE DISCHARGE MANAGEMENT: CPT | Performed by: STUDENT IN AN ORGANIZED HEALTH CARE EDUCATION/TRAINING PROGRAM

## 2020-11-15 PROCEDURE — 82962 GLUCOSE BLOOD TEST: CPT

## 2020-11-15 PROCEDURE — 63710000001 INSULIN LISPRO (HUMAN) PER 5 UNITS: Performed by: NURSE PRACTITIONER

## 2020-11-15 PROCEDURE — 25010000002 INFLUENZA VAC SPLIT QUAD 0.5 ML SUSPENSION PREFILLED SYRINGE: Performed by: STUDENT IN AN ORGANIZED HEALTH CARE EDUCATION/TRAINING PROGRAM

## 2020-11-15 PROCEDURE — 80048 BASIC METABOLIC PNL TOTAL CA: CPT | Performed by: STUDENT IN AN ORGANIZED HEALTH CARE EDUCATION/TRAINING PROGRAM

## 2020-11-15 PROCEDURE — G0378 HOSPITAL OBSERVATION PER HR: HCPCS

## 2020-11-15 PROCEDURE — 99213 OFFICE O/P EST LOW 20 MIN: CPT | Performed by: INTERNAL MEDICINE

## 2020-11-15 RX ORDER — LOSARTAN POTASSIUM 50 MG/1
50 TABLET ORAL
Status: DISCONTINUED | OUTPATIENT
Start: 2020-11-15 | End: 2020-11-15 | Stop reason: HOSPADM

## 2020-11-15 RX ORDER — ASPIRIN 81 MG/1
81 TABLET ORAL DAILY
Qty: 30 TABLET | Refills: 2 | Status: SHIPPED | OUTPATIENT
Start: 2020-11-15 | End: 2022-05-04

## 2020-11-15 RX ORDER — LOSARTAN POTASSIUM 50 MG/1
50 TABLET ORAL
Qty: 30 TABLET | Refills: 2 | Status: SHIPPED | OUTPATIENT
Start: 2020-11-15 | End: 2022-05-04

## 2020-11-15 RX ORDER — INSULIN LISPRO 100 [IU]/ML
2 INJECTION, SOLUTION INTRAVENOUS; SUBCUTANEOUS
Status: DISCONTINUED | OUTPATIENT
Start: 2020-11-15 | End: 2020-11-15

## 2020-11-15 RX ADMIN — INSULIN GLARGINE 10 UNITS: 100 INJECTION, SOLUTION SUBCUTANEOUS at 08:27

## 2020-11-15 RX ADMIN — HYDRALAZINE HYDROCHLORIDE 50 MG: 25 TABLET, FILM COATED ORAL at 08:18

## 2020-11-15 RX ADMIN — SERTRALINE 25 MG: 25 TABLET, FILM COATED ORAL at 08:18

## 2020-11-15 RX ADMIN — INSULIN LISPRO 6 UNITS: 100 INJECTION, SOLUTION INTRAVENOUS; SUBCUTANEOUS at 08:18

## 2020-11-15 RX ADMIN — Medication 10 ML: at 08:17

## 2020-11-15 RX ADMIN — LOSARTAN POTASSIUM 50 MG: 50 TABLET, FILM COATED ORAL at 12:31

## 2020-11-15 RX ADMIN — INFLUENZA VIRUS VACCINE 0.5 ML: 15; 15; 15; 15 SUSPENSION INTRAMUSCULAR at 16:41

## 2020-11-15 RX ADMIN — INSULIN LISPRO 4 UNITS: 100 INJECTION, SOLUTION INTRAVENOUS; SUBCUTANEOUS at 12:31

## 2020-11-15 RX ADMIN — METOPROLOL SUCCINATE 50 MG: 50 TABLET, EXTENDED RELEASE ORAL at 08:18

## 2020-11-15 RX ADMIN — LEVOTHYROXINE SODIUM 137 MCG: 0.03 TABLET ORAL at 08:18

## 2020-11-15 NOTE — DISCHARGE SUMMARY
[facility] Medicine Services  DISCHARGE SUMMARY        Prepared For PCP:  Sandi Allen APRN    Patient Name: Caty Barnes  : 1960  MRN: [id number]      Date of Admission:   2020    Date of Discharge:  11/15/2020    Length of stay:  LOS: 0 days     Hospital Course     Presenting Problem:   ST elevation myocardial infarction (STEMI), unspecified artery (CMS/HCC) [I21.3]  Takotsubo cardiomyopathy [I51.81]      Active Hospital Problems    Diagnosis  POA   • **Takotsubo cardiomyopathy [I51.81]  Yes   • Diabetes mellitus (CMS/HCC) [E11.9]  Yes   • Hypercholesterolemia [E78.00]  Yes   • Hypothyroidism [E03.9]  Yes   • HTN (hypertension) [I10]  Yes   • Obesity (BMI 30-39.9) [E66.9]  Yes   • Chest pain [R07.9]  Yes      Resolved Hospital Problems   No resolved problems to display.           Hospital Course:  Caty Barnes is a 60 y.o. female  hypertension, hyperlipidemia, hypothyroidism, and diabetes mellitus who presented to [facility] on 2020 with complaints of chest pain.  EKG revealed ST elevations and cardio took to cath lab which showed normal coronaries but suggestion of Takotsubo cardiomyopathy.  Echo confirmed eF 35-40%  With akinesis consistent with Takotsubo's.  Patient admits her teenage daughters recently ran away from home, causing lots of stress.  Patient on asa and BB, zoloft started for mood.  Patient improved the next day, losartan started, which she tolerated.  Held during the day to make sure she did not have an anaphylactic reaction, which she did not.  Patient was discharged home with instructions to follow up in office with Dr. Calvin within two weeks.          Recommendation for Outpatient Providers:       - Follow up with PCP in 5-7 days  - Follow up with Dr. Ambrosio in 2 weeks  - Take Zoloft 50mg by mouth daily  -Take Losartan 50mg by mouth daily  -Take baby aspirin 81mg daily        Reasons For Change In Medications and Indications  for New Medications:        Day of Discharge     HPI:   No overnight events, clear to go home if tolerates losartan    Vital Signs:   Temp:  [97.9 °F (36.6 °C)-98.5 °F (36.9 °C)] 98.4 °F (36.9 °C)  Heart Rate:  [81-87] 84  Resp:  [16-20] 18  BP: (138-154)/(57-90) 142/83     Physical Exam:  Physical Exam  Constitutional:       General: She is not in acute distress.     Appearance: She is obese. She is not toxic-appearing.   HENT:      Head: Normocephalic and atraumatic.      Nose: Nose normal.      Mouth/Throat:      Pharynx: Oropharynx is clear.   Eyes:      General: No scleral icterus.  Neck:      Musculoskeletal: Normal range of motion. No neck rigidity.   Cardiovascular:      Rate and Rhythm: Normal rate and regular rhythm.      Heart sounds: No murmur. No friction rub. No gallop.    Pulmonary:      Effort: No respiratory distress.      Breath sounds: No wheezing or rales.   Abdominal:      General: There is no distension.      Tenderness: There is no abdominal tenderness. There is no guarding.   Musculoskeletal:         General: No swelling, tenderness or signs of injury.   Lymphadenopathy:      Cervical: No cervical adenopathy.   Skin:     Coloration: Skin is not jaundiced.      Findings: No bruising or lesion.   Neurological:      Mental Status: She is alert.   Psychiatric:         Mood and Affect: Mood normal.         Behavior: Behavior normal.         Thought Content: Thought content normal.         Judgment: Judgment normal.         Pertinent  and/or Most Recent Results     Results from last 7 days   Lab Units 11/15/20  0315 11/14/20  0246 11/13/20  1228   WBC 10*3/mm3 8.00 7.50 8.90   HEMOGLOBIN g/dL 13.2 12.6 12.9   HEMATOCRIT % 40.2 38.7 39.2   PLATELETS 10*3/mm3 341 316 318   SODIUM mmol/L 140 142 143   POTASSIUM mmol/L 3.9 4.2 4.1   CHLORIDE mmol/L 102 105 104   CO2 mmol/L 27.0 27.0 26.0   BUN mg/dL 12 15 14   CREATININE mg/dL 0.67 0.67 0.65   GLUCOSE mg/dL 233* 179* 248*   CALCIUM mg/dL 9.1 9.0 9.4      Results from last 7 days   Lab Units 11/13/20  1228   BILIRUBIN mg/dL 0.6   ALK PHOS U/L 108   ALT (SGPT) U/L 15   AST (SGOT) U/L 17   PROTIME Seconds 10.1   INR  <0.93*   APTT seconds 23.6*           Invalid input(s): TG, LDLCALC, LDLREALC  Results from last 7 days   Lab Units 11/14/20  0246 11/13/20  1405 11/13/20  1228   TSH uIU/mL 0.566  --   --    HEMOGLOBIN A1C %  --   --  8.9*   TROPONIN T ng/mL  --  0.167* 0.137*       Brief Urine Lab Results     None          Microbiology Results Abnormal     Procedure Component Value - Date/Time    COVID PRE-OP / PRE-PROCEDURE SCREENING ORDER (NO ISOLATION) - Swab, Nasopharynx [[id number]]  (Normal) Collected: 11/13/20 1257    Lab Status: Final result Specimen: Swab from Nasopharynx Updated: 11/13/20 1345    Narrative:      The following orders were created for panel order COVID PRE-OP / PRE-PROCEDURE SCREENING ORDER (NO ISOLATION) - Swab, Nasopharynx.  Procedure                               Abnormality         Status                     ---------                               -----------         ------                     COVID-19,CEPHEID,COR/SAE...[[id number]]  Normal              Final result                 Please view results for these tests on the individual orders.    COVID-19,CEPHEID,COR/SAE/PAD IN-HOUSE(OR EMERGENT/ADD-ON),NP SWAB IN TRANSPORT MEDIA 3-4 HR TAT - Swab, Nasopharynx [[id number]]  (Normal) Collected: 11/13/20 1257    Lab Status: Final result Specimen: Swab from Nasopharynx Updated: 11/13/20 1345     COVID19 Not Detected    Narrative:      Fact sheet for providers: https://www.fda.gov/media/248637/download     Fact sheet for patients: https://www.fda.gov/media/962654/download          Xr Chest 1 View    Result Date: 11/13/2020  Impression: 1. Focal opacity within the right cardiophrenic angle which may reflect atelectasis or infection.  Electronically Signed By-Wayne Poon MD On:11/13/2020 12:44 PM This report was finalized on [id number] by   Wayne Poon MD.      Results for orders placed during the hospital encounter of 07/24/20   Duplex Venous Lower Extremity - Left    Narrative · Normal left lower extremity venous duplex scan.          Results for orders placed during the hospital encounter of 07/24/20   Duplex Venous Lower Extremity - Left    Narrative · Normal left lower extremity venous duplex scan.          Results for orders placed during the hospital encounter of 11/13/20   Adult Transthoracic Echo Complete W/ Cont if Necessary Per Protocol    Narrative · Left ventricular systolic function is moderately decreased.  · Left ventricular ejection fraction is 35 to 40%  · Akinetic distal anterior wall apex and distal septum and inferior wall   noted. Basal segment for hypercontractile. This is consistent with   Takotsubo syndrome  · Left ventricular diastolic function is consistent with (grade I)   impaired relaxation.  · Left ventricular wall thickness is consistent with concentric   hypertrophy.                  Test Results Pending at Discharge        Procedures Performed  Procedure(s):  Left Heart Cath  Left ventriculography  Coronary angiography         Consults:   Consults     Date and Time Order Name Status Description    11/13/2020 1239 Consult Interventional Cardiology and Notify Cath Lab Completed             Discharge Details        Discharge Medications      New Medications      Instructions Start Date   aspirin 81 MG EC tablet   81 mg, Oral, Daily      losartan 50 MG tablet  Commonly known as: COZAAR   50 mg, Oral, Every 24 Hours Scheduled         Continue These Medications      Instructions Start Date   atorvastatin 40 MG tablet  Commonly known as: LIPITOR   40 mg, Oral, Daily      cyclobenzaprine 5 MG tablet  Commonly known as: FLEXERIL   5 mg, Oral, 3 Times Daily PRN      levothyroxine 137 MCG tablet  Commonly known as: SYNTHROID, LEVOTHROID   137 mcg, Oral, Daily      metFORMIN  MG 24 hr tablet  Commonly known as:  GLUCOPHAGE-XR   1,000 mg, Oral, 2 times daily      metoprolol succinate XL 50 MG 24 hr tablet  Commonly known as: TOPROL-XL   50 mg, Oral, Daily      sertraline 25 MG tablet  Commonly known as: ZOLOFT   50 mg, Oral, Daily PRN             Allergies   Allergen Reactions   • Lisinopril Anaphylaxis   • Naproxen Anaphylaxis   • Nifedipine Anaphylaxis   • Sulfa Antibiotics GI Intolerance         Discharge Disposition:  Home or Self Care    Diet:  Hospital:  Diet Order   Procedures   • Diet Cardiac; Healthy Heart         Discharge Activity:   Activity Instructions     Activity as Tolerated              CODE STATUS:    Code Status and Medical Interventions:   Ordered at: 11/13/20 6281     Limited Support to NOT Include:    Intubation     Level Of Support Discussed With:    Patient     Code Status:    No CPR     Medical Interventions (Level of Support Prior to Arrest):    Limited         Follow-up Appointments  No future appointments.    Additional Instructions for the Follow-ups that You Need to Schedule     Call MD With Problems / Concerns   As directed      Instructions: Call [phone] or email [email or web address] for problems or concerns.    Order Comments: Instructions: Call [phone] or email [email or web address] for problems or concerns.          Discharge Follow-up with PCP   As directed       Currently Documented PCP:    Sandi Allen APRN    PCP Phone Number:    [phone]     Follow Up Details: Follow up with PCP in 5-7 days         Discharge Follow-up with Specified Provider: Dr. Ambrosio; 2 Weeks   As directed      To: Dr. Ambrosio    Follow Up: 2 Weeks    Follow Up Details: Follow up with Dr. Ambrosio in 2 weeks                 Condition on Discharge:      Stable      This patient has been examined wearing appropriate Personal Protective Equipment and discussed with Patient. 11/15/20      Electronically signed by Tapan Nicole DO, 11/15/20, 10:11 AM EST.      Time: I spent  35  minutes on this  discharge activity which included face-to-face encounter with the patient/reviewing the data in the system/coordination of the care with the nursing staff as well as consultants/documentation/entering orders.

## 2020-11-15 NOTE — PLAN OF CARE
Problem: Adult Inpatient Plan of Care  Goal: Plan of Care Review  Outcome: Ongoing, Progressing  Flowsheets  Taken 11/15/2020 1103 by Rin Upton, RN  Outcome Summary: Zoloft started today. Losartan started today. Will see how patient responds, and then discharge this afternoon. No distress noted. Will continue to monitor.  Taken 11/13/2020 1335 by Jen Barreto RN  Plan of Care Reviewed With: patient   Goal Outcome Evaluation:  Plan of Care Reviewed With: patient  Progress: improving  Outcome Summary: Zoloft started today. Losartan started today. Will see how patient responds, and then discharge this afternoon. No distress noted. Will continue to monitor.

## 2020-11-15 NOTE — PLAN OF CARE
Goal Outcome Evaluation:  Plan of Care Reviewed With: patient  Progress: improving  Pt has been sleeping well during the night and has no complaints of pain. Will continue to monitor.

## 2020-11-15 NOTE — PROGRESS NOTES
CARDIOLOGY FOLLOW-UP PROGRESS NOTE      Reason for follow-up:    Chest Pain  SOA  Takotsubo cardiomyopathy     Attending: Tapan Nicole DO Subjective .     No chest pain or dyspnea     Review of Systems   Constitution: Negative for decreased appetite and diaphoresis.   HENT: Negative for congestion, hearing loss and nosebleeds.    Cardiovascular: Negative for chest pain, claudication, dyspnea on exertion, irregular heartbeat, leg swelling, near-syncope, orthopnea, palpitations, paroxysmal nocturnal dyspnea and syncope.   Respiratory: Negative for cough, shortness of breath and sleep disturbances due to breathing.    Endocrine: Negative for polyuria.   Hematologic/Lymphatic: Does not bruise/bleed easily.   Skin: Negative for itching and rash.   Musculoskeletal: Negative for back pain, muscle weakness and myalgias.   Gastrointestinal: Negative for abdominal pain, change in bowel habit and nausea.   Genitourinary: Negative for dysuria, flank pain, frequency and hesitancy.   Neurological: Negative for dizziness, tremors and weakness.   Psychiatric/Behavioral: Positive for depression. Negative for altered mental status. The patient is nervous/anxious. The patient does not have insomnia.        Allergies: Lisinopril, Naproxen, Nifedipine, and Sulfa antibiotics    Scheduled Meds:atorvastatin, 40 mg, Oral, Nightly  heparin (porcine), 4,000 Units, Intravenous, Once  insulin glargine, 10 Units, Subcutaneous, Daily  insulin lispro, 0-9 Units, Subcutaneous, TID AC  levothyroxine, 137 mcg, Oral, Daily  losartan, 50 mg, Oral, Q24H  metoprolol succinate XL, 50 mg, Oral, Daily  sertraline, 25 mg, Oral, Daily        Continuous Infusions:nitroglycerin, 10-50 mcg/min, Last Rate: Stopped (11/13/20 1840)  sodium chloride, 75 mL/hr        PRN Meds:.•  acetaminophen  •  atropine  •  cyclobenzaprine  •  dextrose  •  dextrose  •  glucagon (human recombinant)  •  influenza vaccine  •  insulin lispro **AND** insulin lispro  •   "sertraline  •  sodium chloride  •  sodium chloride    Objective     VITAL SIGNS  Patient Vitals for the past 24 hrs:   BP Temp Temp src Pulse Resp SpO2 Weight   11/15/20 1421 121/56 98 °F (36.7 °C) Oral 80 16 100 % --   11/15/20 1013 114/75 98.3 °F (36.8 °C) Oral 84 18 100 % --   11/15/20 0817 142/83 -- -- 84 -- -- --   11/15/20 0538 142/90 98.4 °F (36.9 °C) Oral 85 18 100 % --   11/15/20 0500 -- -- -- -- -- -- 90.1 kg (198 lb 10.2 oz)   11/15/20 0256 154/89 98.1 °F (36.7 °C) Oral 82 20 100 % --   11/14/20 2136 141/72 98.5 °F (36.9 °C) Oral 81 16 97 % --   11/14/20 2009 152/57 -- -- 87 -- -- --   11/14/20 1751 147/78 97.9 °F (36.6 °C) Oral 81 16 96 % --        Flowsheet Rows      First Filed Value   Admission Height  162.6 cm (64\") Documented at 11/13/2020 1205   Admission Weight  90.3 kg (199 lb 1.2 oz) Documented at 11/13/2020 1205            Intake/Output Summary (Last 24 hours) at 11/15/2020 1434  Last data filed at 11/15/2020 1421  Gross per 24 hour   Intake 2400 ml   Output 300 ml   Net 2100 ml        TELEMETRY:     SR    Physical Exam:  Constitutional:       General: Not in acute distress.     Appearance: Normal appearance. Well-developed.   Eyes:      Pupils: Pupils are equal, round, and reactive to light.   HENT:      Head: Normocephalic and atraumatic.   Neck:      Musculoskeletal: Normal range of motion and neck supple.      Vascular: No JVD.   Pulmonary:      Effort: Pulmonary effort is normal.      Breath sounds: Normal breath sounds.   Cardiovascular:      Normal rate. Regular rhythm.   Pulses:     Intact distal pulses.   Edema:     Peripheral edema absent.   Abdominal:      General: There is no distension.      Palpations: Abdomen is soft.      Tenderness: There is no abdominal tenderness.   Musculoskeletal: Normal range of motion.   Skin:     General: Skin is warm and dry.   Neurological:      Mental Status: Alert and oriented to person, place, and time.            Results Review:   I reviewed the " patient's new clinical results.    CBC    Results from last 7 days   Lab Units 11/15/20  0315 11/14/20  0246 11/13/20  1228   WBC 10*3/mm3 8.00 7.50 8.90   HEMOGLOBIN g/dL 13.2 12.6 12.9   PLATELETS 10*3/mm3 341 316 318     BMP   Results from last 7 days   Lab Units 11/15/20  0315 11/14/20  0246 11/13/20  1228   SODIUM mmol/L 140 142 143   POTASSIUM mmol/L 3.9 4.2 4.1   CHLORIDE mmol/L 102 105 104   CO2 mmol/L 27.0 27.0 26.0   BUN mg/dL 12 15 14   CREATININE mg/dL 0.67 0.67 0.65   GLUCOSE mg/dL 233* 179* 248*   MAGNESIUM mg/dL  --   --  1.8     Cr Clearance Estimated Creatinine Clearance: 97.1 mL/min (by C-G formula based on SCr of 0.67 mg/dL).  Coag   Results from last 7 days   Lab Units 11/13/20  1228   INR  <0.93*   APTT seconds 23.6*     HbA1C   Lab Results   Component Value Date    HGBA1C 8.9 (H) 11/13/2020     Blood Glucose   Glucose   Date/Time Value Ref Range Status   11/15/2020 1118 203 (H) 70 - 105 mg/dL Final     Comment:     Serial Number: [id number]:  [id number]   11/15/2020 0722 256 (H) 70 - 105 mg/dL Final     Comment:     Serial Number: [id number]:  [id number]   11/14/2020 1948 219 (H) 70 - 105 mg/dL Final     Comment:     Serial Number: [id number]:  [id number]   11/14/2020 1627 198 (H) 70 - 105 mg/dL Final     Comment:     Serial Number: [id number]:  [id number]   11/14/2020 1108 269 (H) 70 - 105 mg/dL Final     Comment:     Serial Number: [id number]:  [id number]   11/14/2020 0726 174 (H) 70 - 105 mg/dL Final     Comment:     Serial Number: [id number]:  [id number]   11/13/2020 2021 226 (H) 70 - 105 mg/dL Final     Comment:     Serial Number: [id number]:  [id number]   11/13/2020 1749 214 (H) 70 - 105 mg/dL Final     Comment:     Serial Number: [id number]:  [id number]     Infection     CMP   Results from last 7 days   Lab Units 11/15/20  0315 11/14/20  0246 11/13/20  1228   SODIUM mmol/L 140 142 143   POTASSIUM mmol/L 3.9 4.2 4.1   CHLORIDE mmol/L 102 105  104   CO2 mmol/L 27.0 27.0 26.0   BUN mg/dL 12 15 14   CREATININE mg/dL 0.67 0.67 0.65   GLUCOSE mg/dL 233* 179* 248*   ALBUMIN g/dL  --   --  4.30   BILIRUBIN mg/dL  --   --  0.6   ALK PHOS U/L  --   --  108   AST (SGOT) U/L  --   --  17   ALT (SGPT) U/L  --   --  15     ABG      UA      YONY  No results found for: POCMETH, POCAMPHET, POCBARBITUR, POCBENZO, POCCOCAINE, POCOPIATES, POCOXYCODO, POCPHENCYC, POCPROPOXY, POCTHC, POCTRICYC  Lysis Labs   Results from last 7 days   Lab Units 11/15/20  0315 11/14/20  0246 11/13/20  1228   INR   --   --  <0.93*   APTT seconds  --   --  23.6*   HEMOGLOBIN g/dL 13.2 12.6 12.9   PLATELETS 10*3/mm3 341 316 318   CREATININE mg/dL 0.67 0.67 0.65     Radiology(recent) No radiology results for the last day    Imaging Results (Last 24 Hours)     ** No results found for the last 24 hours. **          Results from last 7 days   Lab Units 11/13/20  1405   TROPONIN T ng/mL 0.167*       EKG              I personally viewed and interpreted the patient's EKG/Telemetry data:        ECHOCARDIOGRAM:     Results for orders placed during the hospital encounter of 11/13/20   Adult Transthoracic Echo Complete W/ Cont if Necessary Per Protocol    Narrative · Left ventricular systolic function is moderately decreased.  · Left ventricular ejection fraction is 35 to 40%  · Akinetic distal anterior wall apex and distal septum and inferior wall   noted. Basal segment for hypercontractile. This is consistent with   Takotsubo syndrome  · Left ventricular diastolic function is consistent with (grade I)   impaired relaxation.  · Left ventricular wall thickness is consistent with concentric   hypertrophy.            STRESS MYOVIEW:      CARDIAC CATHETERIZATION:      OTHER:         Assessment/Plan            Takotsubo cardiomyopathy    Diabetes mellitus (CMS/HCC)    Hypercholesterolemia    Hypothyroidism    HTN (hypertension)    Obesity (BMI 30-39.9)    Chest pain        ASSESSMENT:    Chest pain with ST  segment abnormality consistent with a STEMI  Diabetes  Hypertension  Hyperlipidemia          PLAN:  Admitted with chest pain and had ST segment elevation in 1 and aVL consistent with STEMI  Patient is taken to cardiac catheterization laboratory and had a cardiac catheterization which showed normal coronaries and LV dysfunction consistent Takotsubo cardiomyopathy       EF 35-40% with apical akinesis consistent with tako tsubo  Continue Medical Rx BB, ARB  Started on SSRI    F/U with Dr. Ambrosio in 2 weeks    Ok for d/c home from cards standpoint    Additional recommendations per Dr. Sullivan    I discussed the patients findings and my recommendations with patient and RN    Patient is seen and examined and findings are verified.  Patient denies any symptom of angina pectoris or congestive heart failure.  Patient denies orthopnea or PND.  Her mood is slightly better.    Hemodynamics are stable.    Normal S1 and S2.  No pericardial rub or murmur.    At this stage, patient is doing fairly well from cardiovascular standpoint.  Patient can be discharged and follow-up with Dr. Ambrosio.  I would recommend to start Zoloft.  I have discussed with Dr. Nicole.      Agustin Sullivan MD  11/15/20  14:34 EST

## 2020-11-15 NOTE — SIGNIFICANT NOTE
Discharged home with no other services. IV removed. Education give. Work release given. Walked to lobby by RN and spouse.

## 2020-11-16 NOTE — PROGRESS NOTES
Case Management Discharge Note      Final Note: Unable to case manage prior to discharge.         Selected Continued Care - Discharged on 11/15/2020 Admission date: 11/13/2020 - Discharge disposition: Home or Self Care     Final Discharge Disposition Code: 01 - home or self-care

## 2020-11-18 LAB — QT INTERVAL: 387 MS

## 2020-11-23 ENCOUNTER — TELEPHONE (OUTPATIENT)
Dept: CARDIOLOGY | Facility: CLINIC | Age: 60
End: 2020-11-23

## 2020-11-23 NOTE — TELEPHONE ENCOUNTER
Forms for [employer] and [employer] received to be completed. Cardiac Cath on 11/13/20, follow up in office 11/30/20.

## 2020-11-24 NOTE — TELEPHONE ENCOUNTER
How long does the patient need to be off from work? Admitted on 11/13- Cath showed normal coronaries, no stents placed, Echo showed Cardiomyopathy. She has an appt on 11/30/20.

## 2020-11-30 ENCOUNTER — OFFICE VISIT (OUTPATIENT)
Dept: CARDIOLOGY | Facility: CLINIC | Age: 60
End: 2020-11-30

## 2020-11-30 VITALS
TEMPERATURE: 97.3 F | SYSTOLIC BLOOD PRESSURE: 147 MMHG | OXYGEN SATURATION: 96 % | HEART RATE: 92 BPM | HEIGHT: 64 IN | WEIGHT: 197 LBS | BODY MASS INDEX: 33.63 KG/M2 | DIASTOLIC BLOOD PRESSURE: 93 MMHG

## 2020-11-30 DIAGNOSIS — I51.81 STRESS-INDUCED CARDIOMYOPATHY: ICD-10-CM

## 2020-11-30 DIAGNOSIS — I50.23 ACUTE ON CHRONIC SYSTOLIC CONGESTIVE HEART FAILURE (HCC): Primary | ICD-10-CM

## 2020-11-30 DIAGNOSIS — I10 ESSENTIAL HYPERTENSION: ICD-10-CM

## 2020-11-30 DIAGNOSIS — E78.00 PURE HYPERCHOLESTEROLEMIA: ICD-10-CM

## 2020-11-30 PROCEDURE — 99213 OFFICE O/P EST LOW 20 MIN: CPT | Performed by: INTERNAL MEDICINE

## 2020-11-30 NOTE — PROGRESS NOTES
Subjective:     Encounter Date:11/30/2020      Patient ID: Caty Barnes is a 60 y.o. female.    Chief Complaint:  History of Present Illness 60-year-old white female with recently diagnosed Takotsubo cardiomyopathy hypertension hyperlipidemia presents to my office for follow-up.  Patient is currently stable without any symptoms of chest pain or shortness of breath at rest on exertion.  No complains of any PND orthopnea.  No palpitation dizziness syncope or swelling of the feet.  Patient has been taking her medicines regularly.  He recently was the hospital with shortness of breath and had an echocardiogram which showed LV dysfunction but had a cardiac iteration which showed normal coronaries.  She does not smoke.  She is trying to exercise regularly she is following good diet.    The following portions of the patient's history were reviewed and updated as appropriate: allergies, current medications, past family history, past medical history, past social history, past surgical history and problem list.  Past Medical History:   Diagnosis Date   • Diabetes mellitus (CMS/HCC) 11/13/2020   • HTN (hypertension) 11/13/2020   • Hypercholesterolemia 11/13/2020   • Hypothyroidism 11/13/2020   • Takotsubo cardiomyopathy 11/13/2020     Past Surgical History:   Procedure Laterality Date   • BACK SURGERY     • CARDIAC CATHETERIZATION N/A 11/13/2020    Procedure: Left Heart Cath;  Surgeon: Luis Ambrosio MD;  Location: [facility] CATH INVASIVE LOCATION;  Service: Cardiology;  Laterality: N/A;   • CARDIAC CATHETERIZATION N/A 11/13/2020    Procedure: Left ventriculography;  Surgeon: Luis Ambrosio MD;  Location: [facility] CATH INVASIVE LOCATION;  Service: Cardiology;  Laterality: N/A;   • CARDIAC CATHETERIZATION N/A 11/13/2020    Procedure: Coronary angiography;  Surgeon: Luis Ambrosio MD;  Location: [facility] CATH INVASIVE LOCATION;  Service: Cardiology;  Laterality: N/A;   • CHOLECYSTECTOMY     • LAPAROSCOPIC TUBAL LIGATION     •  "THROAT SURGERY       /93   Pulse 92   Temp 97.3 °F (36.3 °C)   Ht 162.6 cm (64\")   Wt 89.4 kg (197 lb)   SpO2 96%   BMI 33.81 kg/m²   Family History   Problem Relation Age of Onset   • Heart disease Father    • Heart disease Paternal Aunt    • Heart disease Paternal Grandmother    • Hypertension Paternal Grandfather    Current outpatient and discharge medications have been reconciled for the patient.  Reviewed by: Luis Ambrosio MD      Current Outpatient Medications:   •  aspirin (aspirin) 81 MG EC tablet, Take 1 tablet by mouth Daily., Disp: 30 tablet, Rfl: 2  •  atorvastatin (LIPITOR) 40 MG tablet, Take 40 mg by mouth Daily., Disp: , Rfl:   •  cyclobenzaprine (FLEXERIL) 5 MG tablet, Take 5 mg by mouth 3 (Three) Times a Day As Needed for Muscle Spasms., Disp: , Rfl:   •  levothyroxine (SYNTHROID, LEVOTHROID) 137 MCG tablet, Take 137 mcg by mouth Daily., Disp: , Rfl:   •  losartan (COZAAR) 50 MG tablet, Take 1 tablet by mouth Daily., Disp: 30 tablet, Rfl: 2  •  metFORMIN ER (GLUCOPHAGE-XR) 500 MG 24 hr tablet, Take 1,000 mg by mouth 2 (two) times a day., Disp: , Rfl:   •  metoprolol succinate XL (TOPROL-XL) 50 MG 24 hr tablet, Take 50 mg by mouth Daily., Disp: , Rfl:   •  sertraline (ZOLOFT) 50 MG tablet, Take 1 tablet by mouth Daily As Needed (Anxiety)., Disp: 30 tablet, Rfl: 2  Allergies   Allergen Reactions   • Lisinopril Anaphylaxis   • Naproxen Anaphylaxis   • Nifedipine Anaphylaxis   • Sulfa Antibiotics GI Intolerance     Social History     Socioeconomic History   • Marital status:      Spouse name: Not on file   • Number of children: Not on file   • Years of education: Not on file   • Highest education level: Not on file   Tobacco Use   • Smoking status: Never Smoker   • Smokeless tobacco: Never Used   Substance and Sexual Activity   • Alcohol use: Not Currently   • Drug use: Never   • Sexual activity: Defer     Review of Systems   Constitution: Negative for fever and malaise/fatigue. "   Cardiovascular: Negative for chest pain, dyspnea on exertion, leg swelling and palpitations.   Respiratory: Negative for cough and shortness of breath.    Skin: Negative for rash.   Gastrointestinal: Negative for abdominal pain, nausea and vomiting.   Neurological: Positive for numbness. Negative for focal weakness, headaches and light-headedness.   All other systems reviewed and are negative.             Objective:     Constitutional:       Appearance: Well-developed.   Eyes:      General: No scleral icterus.     Conjunctiva/sclera: Conjunctivae normal.   HENT:      Head: Normocephalic and atraumatic.   Neck:      Musculoskeletal: Normal range of motion and neck supple.      Vascular: No carotid bruit or JVD.   Pulmonary:      Effort: Pulmonary effort is normal.      Breath sounds: Normal breath sounds. No wheezing. No rales.   Cardiovascular:      Normal rate. Regular rhythm.   Pulses:     Intact distal pulses.   Abdominal:      General: Bowel sounds are normal.      Palpations: Abdomen is soft.   Skin:     General: Skin is warm and dry.      Findings: No rash.   Neurological:      Mental Status: Alert.       Procedures    Lab Review:       Assessment:          Diagnosis Plan   1. Acute on chronic systolic congestive heart failure (CMS/HCC)     2. Stress-induced cardiomyopathy     3. Essential hypertension     4. Pure hypercholesterolemia            Plan:       Patient has history of congestive heart failure with her LV systolic dysfunction and stress-induced cardiomyopathy  Patient is currently on medical therapy and will adjust medicines according to her blood pressure heart rate  Patient will have an echocardiogram at her next visit  Patient lipid levels are followed by the primary care doctor  Patient blood pressure currently stable at home.

## 2021-01-18 ENCOUNTER — TELEPHONE (OUTPATIENT)
Dept: CARDIOLOGY | Facility: CLINIC | Age: 61
End: 2021-01-18

## 2021-01-18 NOTE — TELEPHONE ENCOUNTER
Pt came into the office this morning to follow up on [employer] paperwork.     Status: [employer] faxed [employer] request 1/8/21  Sent to  [employer]LO team in [place].    They have 30 days to send out information.    Printed completed forms and gave to patient along with signed release copies.    Advised patient of the 30 days-  Company should have records by 2/8/21. She understands.

## 2021-06-24 ENCOUNTER — APPOINTMENT (OUTPATIENT)
Dept: CARDIOLOGY | Facility: HOSPITAL | Age: 61
End: 2021-06-24

## 2021-11-15 ENCOUNTER — HOSPITAL ENCOUNTER (EMERGENCY)
Facility: HOSPITAL | Age: 61
Discharge: HOME | End: 2021-11-15
Attending: EMERGENCY MEDICINE | Admitting: EMERGENCY MEDICINE

## 2021-11-15 VITALS
DIASTOLIC BLOOD PRESSURE: 94 MMHG | OXYGEN SATURATION: 97 % | HEART RATE: 82 BPM | SYSTOLIC BLOOD PRESSURE: 162 MMHG | BODY MASS INDEX: 33.63 KG/M2 | WEIGHT: 197 LBS | TEMPERATURE: 98.3 F | HEIGHT: 64 IN | RESPIRATION RATE: 16 BRPM

## 2021-11-15 DIAGNOSIS — T78.3XXA ANGIOEDEMA, INITIAL ENCOUNTER: Primary | ICD-10-CM

## 2021-11-15 PROCEDURE — 99283 EMERGENCY DEPT VISIT LOW MDM: CPT

## 2021-11-15 PROCEDURE — 25010000002 DIPHENHYDRAMINE PER 50 MG: Performed by: EMERGENCY MEDICINE

## 2021-11-15 PROCEDURE — 86161 COMPLEMENT/FUNCTION ACTIVITY: CPT | Performed by: EMERGENCY MEDICINE

## 2021-11-15 PROCEDURE — 96375 TX/PRO/DX INJ NEW DRUG ADDON: CPT

## 2021-11-15 PROCEDURE — 25010000002 METHYLPREDNISOLONE PER 125 MG: Performed by: EMERGENCY MEDICINE

## 2021-11-15 PROCEDURE — 96374 THER/PROPH/DIAG INJ IV PUSH: CPT

## 2021-11-15 PROCEDURE — 86160 COMPLEMENT ANTIGEN: CPT | Performed by: EMERGENCY MEDICINE

## 2021-11-15 RX ORDER — METHYLPREDNISOLONE SODIUM SUCCINATE 125 MG/2ML
125 INJECTION, POWDER, LYOPHILIZED, FOR SOLUTION INTRAMUSCULAR; INTRAVENOUS ONCE
Status: COMPLETED | OUTPATIENT
Start: 2021-11-15 | End: 2021-11-15

## 2021-11-15 RX ORDER — DIPHENHYDRAMINE HYDROCHLORIDE 50 MG/ML
25 INJECTION INTRAMUSCULAR; INTRAVENOUS ONCE
Status: COMPLETED | OUTPATIENT
Start: 2021-11-15 | End: 2021-11-15

## 2021-11-15 RX ORDER — SODIUM CHLORIDE 0.9 % (FLUSH) 0.9 %
10 SYRINGE (ML) INJECTION AS NEEDED
Status: DISCONTINUED | OUTPATIENT
Start: 2021-11-15 | End: 2021-11-16 | Stop reason: HOSPADM

## 2021-11-15 RX ADMIN — DIPHENHYDRAMINE HYDROCHLORIDE 25 MG: 50 INJECTION, SOLUTION INTRAMUSCULAR; INTRAVENOUS at 20:07

## 2021-11-15 RX ADMIN — METHYLPREDNISOLONE SODIUM SUCCINATE 125 MG: 125 INJECTION, POWDER, FOR SOLUTION INTRAMUSCULAR; INTRAVENOUS at 20:05

## 2021-11-15 RX ADMIN — FAMOTIDINE 20 MG: 10 INJECTION INTRAVENOUS at 20:08

## 2021-11-16 LAB — C4 SERPL-MCNC: 45 MG/DL (ref 14–44)

## 2021-11-16 NOTE — ED PROVIDER NOTES
Subjective   History of Present Illness  61-year-old female presents with swelling to lips.  She states that started about 2 hours ago.  She has had history of angioedema in the past.  She had been on lisinopril this have been discontinued.  She also had some food allergies.  She is aware of any exposure that should have caused this tonight.  She is having no difficulty breathing.  Review of Systems  Negative for throat swelling or shortness of breath  Past Medical History:   Diagnosis Date   • Diabetes mellitus (CMS/HCC) 11/13/2020   • HTN (hypertension) 11/13/2020   • Hypercholesterolemia 11/13/2020   • Hypothyroidism 11/13/2020   • Takotsubo cardiomyopathy 11/13/2020       Allergies   Allergen Reactions   • Lisinopril Anaphylaxis   • Naproxen Anaphylaxis   • Nifedipine Anaphylaxis   • Sulfa Antibiotics GI Intolerance       Past Surgical History:   Procedure Laterality Date   • BACK SURGERY     • CARDIAC CATHETERIZATION N/A 11/13/2020    Procedure: Left Heart Cath;  Surgeon: Luis Ambrosio MD;  Location: [facility] CATH INVASIVE LOCATION;  Service: Cardiology;  Laterality: N/A;   • CARDIAC CATHETERIZATION N/A 11/13/2020    Procedure: Left ventriculography;  Surgeon: Luis Ambrosio MD;  Location: [facility] CATH INVASIVE LOCATION;  Service: Cardiology;  Laterality: N/A;   • CARDIAC CATHETERIZATION N/A 11/13/2020    Procedure: Coronary angiography;  Surgeon: Luis Ambrosio MD;  Location: [facility] CATH INVASIVE LOCATION;  Service: Cardiology;  Laterality: N/A;   • CHOLECYSTECTOMY     • LAPAROSCOPIC TUBAL LIGATION     • THROAT SURGERY         Family History   Problem Relation Age of Onset   • Heart disease Father    • Heart disease Paternal Aunt    • Heart disease Paternal Grandmother    • Hypertension Paternal Grandfather        Social History     Socioeconomic History   • Marital status:    Tobacco Use   • Smoking status: Never Smoker   • Smokeless tobacco: Never Used   Substance and Sexual Activity   • Alcohol use:  "Not Currently   • Drug use: Never   • Sexual activity: Defer     Prior to Admission medications    Medication Sig Start Date End Date Taking? Authorizing Provider   aspirin (aspirin) 81 MG EC tablet Take 1 tablet by mouth Daily. 11/15/20   Tapan Nicole DO   atorvastatin (LIPITOR) 40 MG tablet Take 40 mg by mouth Daily.    ProviderAzul MD   cyclobenzaprine (FLEXERIL) 5 MG tablet Take 5 mg by mouth 3 (Three) Times a Day As Needed for Muscle Spasms.    Azul Herbert MD   levothyroxine (SYNTHROID, LEVOTHROID) 137 MCG tablet Take 137 mcg by mouth Daily.    Azul Herbert MD   losartan (COZAAR) 50 MG tablet Take 1 tablet by mouth Daily. 11/15/20   Tapan Nicole DO   metFORMIN ER (GLUCOPHAGE-XR) 500 MG 24 hr tablet Take 1,000 mg by mouth 2 (two) times a day.    Azul Herbert MD   metoprolol succinate XL (TOPROL-XL) 50 MG 24 hr tablet Take 50 mg by mouth Daily.    Azul Herbert MD   sertraline (ZOLOFT) 50 MG tablet Take 1 tablet by mouth Daily As Needed (Anxiety). 11/15/20   Tapan Nicole DO           Objective   Physical Exam  61-year-old female awake alert.  Examination of face she has angioedema of both upper and lower lip.  Tongue and pharynx is not involved.  Airway is patent.  Chest clear equal breath sounds.  Cardiovascular regular rate and rhythm.  Procedures           ED Course              No radiology results for the last day  Medications   sodium chloride 0.9 % flush 10 mL (has no administration in time range)   methylPREDNISolone sodium succinate (SOLU-Medrol) injection 125 mg (125 mg Intravenous Given 11/15/21 2005)   diphenhydrAMINE (BENADRYL) injection 25 mg (25 mg Intravenous Given 11/15/21 2007)   famotidine (PEPCID) injection 20 mg (20 mg Intravenous Given 11/15/21 2008)     /94 (BP Location: Right arm, Patient Position: Sitting)   Pulse 82   Temp 98.3 °F (36.8 °C) (Oral)   Resp 16   Ht 162.6 cm (64\")   Wt 89.4 kg (197 lb)   SpO2 97%   BMI " 33.81 kg/m²                                     MDM  Patient was treated with Benadryl Pepcid and Solu-Medrol.  C4 complement and C1 esterase activity was obtained which is pending.  Repeat evaluation she has had improvement in her angioedema.  She was discharged.  Advised to discontinue her losartan.  To call her primary provider in a.m., return new or worsening symptoms.  Final diagnoses:   Angioedema, initial encounter       ED Disposition  ED Disposition     ED Disposition Condition Comment    Discharge Stable           Sandi Allen, APRN  [address] [id number] [phone]               Medication List      No changes were made to your prescriptions during this visit.          William Friemdan MD  11/15/21 4644

## 2021-11-16 NOTE — DISCHARGE INSTRUCTIONS
Discontinue losartan, may use Benadryl for swelling as needed.  May also use Pepcid.  Return new or worsening symptoms.

## 2021-11-17 LAB — C1INH ACT/NOR SERPL: >91 %MEAN NORMAL

## 2022-04-04 ENCOUNTER — TELEPHONE (OUTPATIENT)
Dept: CARDIOLOGY | Facility: CLINIC | Age: 62
End: 2022-04-04

## 2022-04-04 NOTE — TELEPHONE ENCOUNTER
DR.MATTHEW ROSENBERG  PH: [phone]  FAX: [phone]  2 LEVEL CERVICAL LAMINECTOMY  SCHEDULED: JEOVANY

## 2022-04-12 NOTE — TELEPHONE ENCOUNTER
Keyanna from [facility] called [phone] opt 2 checking on status of clearance. advsd pt had appt scheduled yesterday 4/11 and no showed. She stated she will contact pt.

## 2022-05-04 ENCOUNTER — OFFICE VISIT (OUTPATIENT)
Dept: CARDIOLOGY | Facility: CLINIC | Age: 62
End: 2022-05-04

## 2022-05-04 VITALS
BODY MASS INDEX: 32.44 KG/M2 | HEIGHT: 64 IN | HEART RATE: 73 BPM | DIASTOLIC BLOOD PRESSURE: 92 MMHG | SYSTOLIC BLOOD PRESSURE: 186 MMHG | OXYGEN SATURATION: 97 % | WEIGHT: 190 LBS

## 2022-05-04 DIAGNOSIS — I10 ESSENTIAL HYPERTENSION: ICD-10-CM

## 2022-05-04 DIAGNOSIS — Z01.810 PREOP CARDIOVASCULAR EXAM: ICD-10-CM

## 2022-05-04 DIAGNOSIS — E78.00 PURE HYPERCHOLESTEROLEMIA: ICD-10-CM

## 2022-05-04 DIAGNOSIS — E11.9 TYPE 2 DIABETES MELLITUS WITHOUT COMPLICATION, WITHOUT LONG-TERM CURRENT USE OF INSULIN: ICD-10-CM

## 2022-05-04 DIAGNOSIS — I51.81 STRESS-INDUCED CARDIOMYOPATHY: Primary | ICD-10-CM

## 2022-05-04 PROCEDURE — 93000 ELECTROCARDIOGRAM COMPLETE: CPT | Performed by: INTERNAL MEDICINE

## 2022-05-04 PROCEDURE — 99214 OFFICE O/P EST MOD 30 MIN: CPT | Performed by: INTERNAL MEDICINE

## 2022-05-04 NOTE — PROGRESS NOTES
"    Subjective:     Encounter Date:05/04/2022      Patient ID: Jaye Flores is a 62 y.o. female.    Chief Complaint:  History of Present Illness 62-year-old white female with history of stress-induced cardiomyopathy with congestive heart failure hypertension hyperlipidemia presents to my office for a preop evaluation.  Patient is currently stable shortness no chest pain but has occasional shortness of breath with exertion but no comes any PND orthopnea.  No palpitation dizziness syncope or swelling of the feet.  She has been taking her medicines regular.  Her blood pressure is quite high.  She does not smoke.    The following portions of the patient's history were reviewed and updated as appropriate: allergies, current medications, past family history, past medical history, past social history, past surgical history and problem list.  Past Medical History:   Diagnosis Date   • Diabetes mellitus (HCC) 11/13/2020   • HTN (hypertension) 11/13/2020   • Hypercholesterolemia 11/13/2020   • Hypothyroidism 11/13/2020   • Takotsubo cardiomyopathy 11/13/2020     Past Surgical History:   Procedure Laterality Date   • BACK SURGERY     • CARDIAC CATHETERIZATION N/A 11/13/2020    Procedure: Left Heart Cath;  Surgeon: Luis Ambrosio MD;  Location: [facility] CATH INVASIVE LOCATION;  Service: Cardiology;  Laterality: N/A;   • CARDIAC CATHETERIZATION N/A 11/13/2020    Procedure: Left ventriculography;  Surgeon: Luis Ambrosio MD;  Location: [facility] CATH INVASIVE LOCATION;  Service: Cardiology;  Laterality: N/A;   • CARDIAC CATHETERIZATION N/A 11/13/2020    Procedure: Coronary angiography;  Surgeon: Luis Ambrosio MD;  Location: [facility] CATH INVASIVE LOCATION;  Service: Cardiology;  Laterality: N/A;   • CHOLECYSTECTOMY     • LAPAROSCOPIC TUBAL LIGATION     • THROAT SURGERY       BP (!) 186/92 (BP Location: Left arm, Patient Position: Sitting)   Pulse 73   Ht 162.6 cm (64\")   Wt 86.2 kg (190 lb)   SpO2 97%   BMI 32.61 kg/m² "   Family History   Problem Relation Age of Onset   • Heart disease Father    • Heart disease Paternal Aunt    • Heart disease Paternal Grandmother    • Hypertension Paternal Grandfather        Current Outpatient Medications:   •  atorvastatin (LIPITOR) 80 MG tablet, Take 40 mg by mouth Daily., Disp: , Rfl:   •  cyclobenzaprine (FLEXERIL) 5 MG tablet, Take 5 mg by mouth 3 (Three) Times a Day As Needed for Muscle Spasms., Disp: , Rfl:   •  levothyroxine (SYNTHROID, LEVOTHROID) 137 MCG tablet, Take 137 mcg by mouth Daily., Disp: , Rfl:   •  metFORMIN (GLUCOPHAGE) 1000 MG tablet, Take 1,000 mg by mouth 2 (Two) Times a Day With Meals., Disp: , Rfl:   •  metoprolol succinate XL (TOPROL-XL) 50 MG 24 hr tablet, Take 50 mg by mouth Daily., Disp: , Rfl:   Allergies   Allergen Reactions   • Lisinopril Anaphylaxis   • Naproxen Anaphylaxis   • Nifedipine Anaphylaxis   • Sulfa Antibiotics GI Intolerance     Social History     Socioeconomic History   • Marital status:    Tobacco Use   • Smoking status: Never Smoker   • Smokeless tobacco: Never Used   Substance and Sexual Activity   • Alcohol use: Not Currently   • Drug use: Never   • Sexual activity: Defer     Review of Systems   Constitutional: Negative for fever and malaise/fatigue.   Cardiovascular: Negative for chest pain, dyspnea on exertion and palpitations.   Respiratory: Positive for shortness of breath. Negative for cough.    Skin: Negative for rash.   Gastrointestinal: Negative for abdominal pain, nausea and vomiting.   Neurological: Negative for focal weakness and headaches.   All other systems reviewed and are negative.             Objective:     Constitutional:       Appearance: Well-developed.   Eyes:      General: No scleral icterus.     Conjunctiva/sclera: Conjunctivae normal.   HENT:      Head: Normocephalic and atraumatic.   Neck:      Vascular: No carotid bruit or JVD.   Pulmonary:      Effort: Pulmonary effort is normal.      Breath sounds: Normal breath  sounds. No wheezing. No rales.   Cardiovascular:      Normal rate. Regular rhythm.   Pulses:     Intact distal pulses.   Abdominal:      General: Bowel sounds are normal.      Palpations: Abdomen is soft.   Musculoskeletal:      Cervical back: Normal range of motion and neck supple. Skin:     General: Skin is warm and dry.      Findings: No rash.   Neurological:      Mental Status: Alert.         ECG 12 Lead    Date/Time: 5/4/2022 2:46 PM  Performed by: Luis Ambrosio MD  Authorized by: Luis Ambrosio MD   Comments: Sinus rhythm  Left intravesicular block  Abnormal EKG  No new changes from previous ECG            Lab Review:         MDM  1.  History of stress-induced cardiomyopathy  Patient had history of stress-induced cardiomyopathy with normal coronaries and is currently on beta-blockers only.  She did not tolerate lisinopril or losartan secondary to side effects  Patient will be started on hydralazine 50 mg 3 times daily  Patient will have a echocardiogram performed.    2.  Hypertension  Patient blood pressure currently high and will be started on hydralazine along with beta-blockers  3.  Hyperlipidemia  Patient is on statins and the lipids are followed by the primary care doctor  4.  Diabetes  Patient is on oral medicines and followed by the primary care doctor next #5 preop evaluation patient needs preop evaluation for neck surgery and I will check an echocardiogram and release her if her LV function is normal    Patient's previous medical records, labs, and EKG were reviewed and discussed with the patient at today's visit.

## 2022-05-05 ENCOUNTER — TELEPHONE (OUTPATIENT)
Dept: CARDIOLOGY | Facility: CLINIC | Age: 62
End: 2022-05-05

## 2022-05-05 RX ORDER — HYDRALAZINE HYDROCHLORIDE 50 MG/1
50 TABLET, FILM COATED ORAL 3 TIMES DAILY
Qty: 270 TABLET | Refills: 1 | Status: SHIPPED | OUTPATIENT
Start: 2022-05-05 | End: 2022-09-19

## 2022-05-05 NOTE — TELEPHONE ENCOUNTER
Rx Refill Note  Requested Prescriptions     Pending Prescriptions Disp Refills   • hydrALAZINE (APRESOLINE) 50 MG tablet 270 tablet 1     Sig: Take 1 tablet by mouth 3 (Three) Times a Day.      Last office visit with prescribing clinician: 5/4/2022      Next office visit with prescribing clinician: Visit date not found            Jinny Pagan MA  05/05/22, 09:16 EDT

## 2022-05-05 NOTE — TELEPHONE ENCOUNTER
Per the patient and per the OV note, patient is supposed to start Hydralazine 50mg 3x daily. She needs it sent to the [facility] in [place].

## 2022-05-06 ENCOUNTER — APPOINTMENT (OUTPATIENT)
Dept: CARDIOLOGY | Facility: HOSPITAL | Age: 62
End: 2022-05-06

## 2022-05-13 ENCOUNTER — HOSPITAL ENCOUNTER (OUTPATIENT)
Dept: CARDIOLOGY | Facility: HOSPITAL | Age: 62
Discharge: HOME | End: 2022-05-13
Admitting: INTERNAL MEDICINE

## 2022-05-13 VITALS
WEIGHT: 190 LBS | HEIGHT: 64 IN | DIASTOLIC BLOOD PRESSURE: 79 MMHG | SYSTOLIC BLOOD PRESSURE: 130 MMHG | BODY MASS INDEX: 32.44 KG/M2

## 2022-05-13 DIAGNOSIS — Z01.810 PREOP CARDIOVASCULAR EXAM: ICD-10-CM

## 2022-05-13 DIAGNOSIS — I10 ESSENTIAL HYPERTENSION: ICD-10-CM

## 2022-05-13 DIAGNOSIS — E11.9 TYPE 2 DIABETES MELLITUS WITHOUT COMPLICATION, WITHOUT LONG-TERM CURRENT USE OF INSULIN: ICD-10-CM

## 2022-05-13 DIAGNOSIS — E78.00 PURE HYPERCHOLESTEROLEMIA: ICD-10-CM

## 2022-05-13 DIAGNOSIS — I51.81 STRESS-INDUCED CARDIOMYOPATHY: ICD-10-CM

## 2022-05-13 LAB
BH CV ECHO MEAS - ACS: 1.99 CM
BH CV ECHO MEAS - AI P1/2T: 835.2 MSEC
BH CV ECHO MEAS - AO MAX PG: 6.1 MMHG
BH CV ECHO MEAS - AO MEAN PG: 3.7 MMHG
BH CV ECHO MEAS - AO ROOT DIAM: 3.6 CM
BH CV ECHO MEAS - AO V2 MAX: 123.4 CM/SEC
BH CV ECHO MEAS - AO V2 VTI: 26.4 CM
BH CV ECHO MEAS - AVA(I,D): 1.5 CM2
BH CV ECHO MEAS - EDV(CUBED): 84.6 ML
BH CV ECHO MEAS - EDV(MOD-SP4): 51.6 ML
BH CV ECHO MEAS - EF(MOD-BP): 55 %
BH CV ECHO MEAS - EF(MOD-SP4): 55.1 %
BH CV ECHO MEAS - ESV(CUBED): 45.4 ML
BH CV ECHO MEAS - ESV(MOD-SP4): 23.2 ML
BH CV ECHO MEAS - FS: 18.7 %
BH CV ECHO MEAS - IVS/LVPW: 0.91 CM
BH CV ECHO MEAS - IVSD: 1.15 CM
BH CV ECHO MEAS - LA DIMENSION(2D): 3.2 CM
BH CV ECHO MEAS - LV DIASTOLIC VOL/BSA (35-75): 26.9 CM2
BH CV ECHO MEAS - LV MASS(C)D: 192.7 GRAMS
BH CV ECHO MEAS - LV MAX PG: 2.44 MMHG
BH CV ECHO MEAS - LV MEAN PG: 1.54 MMHG
BH CV ECHO MEAS - LV SYSTOLIC VOL/BSA (12-30): 12.1 CM2
BH CV ECHO MEAS - LV V1 MAX: 78.1 CM/SEC
BH CV ECHO MEAS - LV V1 VTI: 16.8 CM
BH CV ECHO MEAS - LVIDD: 4.4 CM
BH CV ECHO MEAS - LVIDS: 3.6 CM
BH CV ECHO MEAS - LVOT AREA: 2.35 CM2
BH CV ECHO MEAS - LVOT DIAM: 1.73 CM
BH CV ECHO MEAS - LVPWD: 1.26 CM
BH CV ECHO MEAS - MV A MAX VEL: 129 CM/SEC
BH CV ECHO MEAS - MV DEC SLOPE: 330.1 CM/SEC2
BH CV ECHO MEAS - MV DEC TIME: 0.22 MSEC
BH CV ECHO MEAS - MV E MAX VEL: 72.4 CM/SEC
BH CV ECHO MEAS - MV E/A: 0.56
BH CV ECHO MEAS - MV MAX PG: 6 MMHG
BH CV ECHO MEAS - MV MEAN PG: 2.03 MMHG
BH CV ECHO MEAS - MV V2 VTI: 27.3 CM
BH CV ECHO MEAS - MVA(VTI): 1.45 CM2
BH CV ECHO MEAS - PA ACC TIME: 0.07 SEC
BH CV ECHO MEAS - PA PR(ACCEL): 46.3 MMHG
BH CV ECHO MEAS - PA V2 MAX: 86.1 CM/SEC
BH CV ECHO MEAS - PI END-D VEL: 94.2 CM/SEC
BH CV ECHO MEAS - PULM A REVS DUR: 0.1 SEC
BH CV ECHO MEAS - PULM A REVS VEL: 33.5 CM/SEC
BH CV ECHO MEAS - PULM DIAS VEL: 35.1 CM/SEC
BH CV ECHO MEAS - PULM S/D: 1.49
BH CV ECHO MEAS - PULM SYS VEL: 52.4 CM/SEC
BH CV ECHO MEAS - RAP SYSTOLE: 3 MMHG
BH CV ECHO MEAS - RVDD: 3.4 CM
BH CV ECHO MEAS - RVSP: 15.5 MMHG
BH CV ECHO MEAS - SI(MOD-SP4): 14.8 ML/M2
BH CV ECHO MEAS - SV(LVOT): 39.6 ML
BH CV ECHO MEAS - SV(MOD-SP4): 28.4 ML
BH CV ECHO MEAS - TR MAX PG: 12.5 MMHG
BH CV ECHO MEAS - TR MAX VEL: 176.3 CM/SEC
MAXIMAL PREDICTED HEART RATE: 158 BPM
STRESS TARGET HR: 134 BPM

## 2022-05-13 PROCEDURE — 93306 TTE W/DOPPLER COMPLETE: CPT | Performed by: INTERNAL MEDICINE

## 2022-05-13 PROCEDURE — 93306 TTE W/DOPPLER COMPLETE: CPT

## 2022-09-19 RX ORDER — HYDRALAZINE HYDROCHLORIDE 50 MG/1
TABLET, FILM COATED ORAL
Qty: 270 TABLET | Refills: 1 | Status: SHIPPED | OUTPATIENT
Start: 2022-09-19

## 2022-09-19 NOTE — TELEPHONE ENCOUNTER
Rx Refill Note  Requested Prescriptions     Pending Prescriptions Disp Refills   • hydrALAZINE (APRESOLINE) 50 MG tablet [Pharmacy Med Name: hydrALAZINE HCl 50 MG Oral Tablet] 270 tablet 1     Sig: TAKE 1 TABLET BY MOUTH THREE TIMES DAILY      Last office visit with prescribing clinician: 5/4/2022      Next office visit with prescribing clinician: Visit date not found            Nimco Duarte MA  09/19/22, 08:16 EDT

## 2023-05-22 RX ORDER — HYDRALAZINE HYDROCHLORIDE 50 MG/1
TABLET, FILM COATED ORAL
Qty: 270 TABLET | Refills: 0 | OUTPATIENT
Start: 2023-05-22

## 2023-05-22 NOTE — TELEPHONE ENCOUNTER
Rx Refill Note  Requested Prescriptions     Pending Prescriptions Disp Refills   • hydrALAZINE (APRESOLINE) 50 MG tablet [Pharmacy Med Name: hydrALAZINE HCl 50 MG Oral Tablet] 270 tablet 0     Sig: TAKE 1 TABLET BY MOUTH THREE TIMES DAILY      Last office visit with prescribing clinician: 5/4/2022   Last telemedicine visit with prescribing clinician: Visit date not found   Next office visit with prescribing clinician: Visit date not found                         Would you like a call back once the refill request has been completed: [] Yes [] No    If the office needs to give you a call back, can they leave a voicemail: [] Yes [] No    Jinny Pagan MA  05/22/23, 16:32 EDT

## 2023-06-01 ENCOUNTER — TELEPHONE (OUTPATIENT)
Dept: CARDIOLOGY | Facility: CLINIC | Age: 63
End: 2023-06-01

## 2023-06-01 NOTE — TELEPHONE ENCOUNTER
Caller: Pawel Flores    Relationship to patient: Emergency Contact    Best call back number: [phone]    Chief complaint: FAST HEART RATE     Type of visit: FOLLOW UP    Requested date: AS SOON AS POSSIBLE      Additional notes:PATIENT STATED BEEN HAVING FAST HEART RATE FOR A COUPLE OF MONTHS AND IT HAS NOT GONE AWAY. PLEASE CONTACT PATIENT TO SCHEDULE APPOINTMENT. HUB COULDN'T SCHEDULED DUE TO NO SCHEDULING DIRECTION. THANK YOU.            Refer to HPI

## 2023-06-08 ENCOUNTER — OFFICE VISIT (OUTPATIENT)
Dept: CARDIOLOGY | Facility: CLINIC | Age: 63
End: 2023-06-08
Payer: MEDICAID

## 2023-06-08 VITALS
OXYGEN SATURATION: 95 % | DIASTOLIC BLOOD PRESSURE: 73 MMHG | HEART RATE: 76 BPM | HEIGHT: 64 IN | BODY MASS INDEX: 33.8 KG/M2 | SYSTOLIC BLOOD PRESSURE: 133 MMHG | WEIGHT: 198 LBS

## 2023-06-08 DIAGNOSIS — I10 ESSENTIAL HYPERTENSION: ICD-10-CM

## 2023-06-08 DIAGNOSIS — E11.9 TYPE 2 DIABETES MELLITUS WITHOUT COMPLICATION, WITHOUT LONG-TERM CURRENT USE OF INSULIN: ICD-10-CM

## 2023-06-08 DIAGNOSIS — I51.81 STRESS-INDUCED CARDIOMYOPATHY: Primary | ICD-10-CM

## 2023-06-08 DIAGNOSIS — E78.00 PURE HYPERCHOLESTEROLEMIA: ICD-10-CM

## 2023-06-08 RX ORDER — LOSARTAN POTASSIUM 50 MG/1
50 TABLET ORAL DAILY
Qty: 90 TABLET | Refills: 3 | Status: SHIPPED | OUTPATIENT
Start: 2023-06-08

## 2023-06-08 RX ORDER — MONTELUKAST SODIUM 4 MG/500MG
4 GRANULE ORAL NIGHTLY
COMMUNITY

## 2023-06-08 RX ORDER — CETIRIZINE HYDROCHLORIDE 10 MG/1
10 TABLET ORAL DAILY
COMMUNITY

## 2023-06-08 NOTE — PROGRESS NOTES
"    Subjective:     Encounter Date:06/08/2023      Patient ID: Jaye Flores is a 63 y.o. female.    Chief Complaint:  History of Present Illness 83-year-old white female with history of Takotsubo cardiomyopathy with congestive heart failure hypertension hyperlipidemia diabetes presents to office for follow-up and patient is currently stable without any symptoms of chest pain or shortness of breath at rest or exertion radiograms any PND orthopnea.  No palpitation dizziness syncope.  She has some swelling of the feet but she is taking her medicines regularly.  She does not smoke.    The following portions of the patient's history were reviewed and updated as appropriate: allergies, current medications, past family history, past medical history, past social history, past surgical history, and problem list.  Past Medical History:   Diagnosis Date    Diabetes mellitus 11/13/2020    HTN (hypertension) 11/13/2020    Hypercholesterolemia 11/13/2020    Hypothyroidism 11/13/2020    Takotsubo cardiomyopathy 11/13/2020     Past Surgical History:   Procedure Laterality Date    BACK SURGERY      CARDIAC CATHETERIZATION N/A 11/13/2020    Procedure: Left Heart Cath;  Surgeon: Luis Ambrosio MD;  Location: [facility] CATH INVASIVE LOCATION;  Service: Cardiology;  Laterality: N/A;    CARDIAC CATHETERIZATION N/A 11/13/2020    Procedure: Left ventriculography;  Surgeon: Luis Ambrosio MD;  Location: [facility] CATH INVASIVE LOCATION;  Service: Cardiology;  Laterality: N/A;    CARDIAC CATHETERIZATION N/A 11/13/2020    Procedure: Coronary angiography;  Surgeon: uLis Abmrosio MD;  Location: [facility] CATH INVASIVE LOCATION;  Service: Cardiology;  Laterality: N/A;    CHOLECYSTECTOMY      LAPAROSCOPIC TUBAL LIGATION      THROAT SURGERY       /75   Pulse 76   Ht 162.6 cm (64\")   Wt 89.8 kg (198 lb)   SpO2 95%   BMI 33.99 kg/m²   Family History   Problem Relation Age of Onset    Heart disease Father     Heart disease Paternal Aunt     " Heart disease Paternal Grandmother     Hypertension Paternal Grandfather        Current Outpatient Medications:     atorvastatin (LIPITOR) 80 MG tablet, Take 40 mg by mouth Daily., Disp: , Rfl:     cetirizine (zyrTEC) 10 MG tablet, Take 1 tablet by mouth Daily., Disp: , Rfl:     cyclobenzaprine (FLEXERIL) 5 MG tablet, Take 1 tablet by mouth 3 (Three) Times a Day As Needed for Muscle Spasms., Disp: , Rfl:     hydrALAZINE (APRESOLINE) 50 MG tablet, TAKE 1 TABLET BY MOUTH THREE TIMES DAILY, Disp: 270 tablet, Rfl: 1    levothyroxine (SYNTHROID, LEVOTHROID) 137 MCG tablet, Take 1 tablet by mouth Daily., Disp: , Rfl:     metFORMIN (GLUCOPHAGE) 1000 MG tablet, Take 1 tablet by mouth 2 (Two) Times a Day With Meals., Disp: , Rfl:     metoprolol succinate XL (TOPROL-XL) 50 MG 24 hr tablet, Take 1 tablet by mouth Daily., Disp: , Rfl:     montelukast (SINGULAIR) 4 MG pack, Take 1 packet by mouth Every Night., Disp: , Rfl:   Allergies   Allergen Reactions    Lisinopril Anaphylaxis    Naproxen Anaphylaxis    Nifedipine Anaphylaxis    Sulfa Antibiotics GI Intolerance     Social History     Socioeconomic History    Marital status:    Tobacco Use    Smoking status: Never    Smokeless tobacco: Never   Substance and Sexual Activity    Alcohol use: Not Currently    Drug use: Never    Sexual activity: Defer     Review of Systems   Constitutional: Positive for malaise/fatigue.   Cardiovascular:  Positive for leg swelling and palpitations. Negative for chest pain and dyspnea on exertion.   Respiratory:  Negative for cough and shortness of breath.    Gastrointestinal:  Negative for abdominal pain, nausea and vomiting.   Neurological:  Negative for dizziness, focal weakness, headaches, light-headedness and numbness.   All other systems reviewed and are negative.           Objective:     Constitutional:       Appearance: Well-developed.   Eyes:      General: No scleral icterus.     Conjunctiva/sclera: Conjunctivae normal.   HENT:       Head: Normocephalic and atraumatic.   Neck:      Vascular: No carotid bruit or JVD.   Pulmonary:      Effort: Pulmonary effort is normal.      Breath sounds: Normal breath sounds. No wheezing. No rales.   Cardiovascular:      Normal rate. Regular rhythm.   Pulses:     Intact distal pulses.   Edema:     Peripheral edema present.  Abdominal:      General: Bowel sounds are normal.      Palpations: Abdomen is soft.   Musculoskeletal:      Cervical back: Normal range of motion and neck supple. Skin:     General: Skin is warm and dry.      Findings: No rash.   Neurological:      Mental Status: Alert.     Procedures    Lab Review:         MDM    Congestive heart failure  Patient has history of Takotsubo cardiomyopathy but is currently on medications including metoprolol and hydralazine and her heart function is normal and stable  Hypertension  Patient blood pressure currently stable on metoprolol and hydralazine  Hyperlipidemia  Patient is on Lipitor and the lipid levels are well within normal limits  Diabetes  Patient is on oral medicines and followed by primary care doctor.    Patient's previous medical records, labs, and EKG were reviewed and discussed with the patient at today's visit.

## 2023-11-20 ENCOUNTER — TELEPHONE (OUTPATIENT)
Dept: CARDIOLOGY | Facility: CLINIC | Age: 63
End: 2023-11-20
Payer: MEDICAID

## 2023-11-20 DIAGNOSIS — I10 PRIMARY HYPERTENSION: Primary | Chronic | ICD-10-CM

## 2023-11-20 RX ORDER — LOSARTAN POTASSIUM 50 MG/1
100 TABLET ORAL DAILY
Qty: 90 TABLET | Refills: 3 | Status: SHIPPED | OUTPATIENT
Start: 2023-11-20

## 2023-11-20 NOTE — TELEPHONE ENCOUNTER
Per Dr. Ambrosio's last office visit note patient was also on hydralazine 50 mg twice daily.  Can you please find out if she is still on this or why she was taken off?  Because that would help bring down her blood pressure.  I want to make sure she is not still taking prior to increasing current medical therapy or adding additional.

## 2023-11-20 NOTE — TELEPHONE ENCOUNTER
Caller: Jaye Souza    Relationship: Self    Best call back number: [phone]    What is the best time to reach you: ANYTIME    Who are you requesting to speak with (clinical staff, provider,  specific staff member): CLINICAL        What was the call regarding:  BRI HAS HER ON LOSARTAN AND METOPROLOL AND HER BP HAS GONE UP /118, 200/112 LAST NIGHT.  WHAT DOES SHE NEED TO DO? SHE IS WANTING TO CHANGE  HER MEDICINE. PLEASE CALL PT    Is it okay if the provider responds through MyChart: NO

## 2023-11-20 NOTE — TELEPHONE ENCOUNTER
Okay, lets increase losartan to 100 mg daily to start before adding additional medication.   Please have her monitor for 5 days and call with readings and at that time will add additional medication if needed.

## 2024-01-16 ENCOUNTER — OFFICE VISIT (OUTPATIENT)
Dept: CARDIOLOGY | Facility: CLINIC | Age: 64
End: 2024-01-16
Payer: MEDICAID

## 2024-01-16 VITALS
HEART RATE: 60 BPM | OXYGEN SATURATION: 98 % | BODY MASS INDEX: 32.44 KG/M2 | DIASTOLIC BLOOD PRESSURE: 73 MMHG | WEIGHT: 190 LBS | SYSTOLIC BLOOD PRESSURE: 173 MMHG | HEIGHT: 64 IN

## 2024-01-16 DIAGNOSIS — E11.9 TYPE 2 DIABETES MELLITUS WITHOUT COMPLICATION, WITHOUT LONG-TERM CURRENT USE OF INSULIN: Primary | ICD-10-CM

## 2024-01-16 DIAGNOSIS — I51.81 STRESS-INDUCED CARDIOMYOPATHY: ICD-10-CM

## 2024-01-16 DIAGNOSIS — I10 PRIMARY HYPERTENSION: Chronic | ICD-10-CM

## 2024-01-16 DIAGNOSIS — E78.00 PURE HYPERCHOLESTEROLEMIA: ICD-10-CM

## 2024-01-16 DIAGNOSIS — G47.33 OBSTRUCTIVE SLEEP APNEA: ICD-10-CM

## 2024-01-16 RX ORDER — LOSARTAN POTASSIUM 50 MG/1
100 TABLET ORAL DAILY
Qty: 180 TABLET | Refills: 3 | Status: SHIPPED | OUTPATIENT
Start: 2024-01-16 | End: 2024-01-16

## 2024-01-16 RX ORDER — LOSARTAN POTASSIUM 100 MG/1
100 TABLET ORAL DAILY
COMMUNITY

## 2024-01-16 NOTE — PROGRESS NOTES
Subjective:     Encounter Date:01/16/2024      Patient ID: Jaye Flores is a 63 y.o. female.    Chief Complaint:  Hypertension  Associated symptoms include shortness of breath. Pertinent negatives include no chest pain, headaches, malaise/fatigue or palpitations.     63-year-old white female with history of stress-induced cardiomyopathy congestive heart failure hypertension hyperlipidemia and diabetes presents to my office for follow-up.  Patient is currently stable without any signs of chest pain but has some shortness of with exertion but no complains any PND or orthopnea.  No palpitation dizziness syncope.  She has some swelling of the feet.  She is taking her medicines regular.  She does not smoke.  Patient is sleep apnea and does not use a CPAP machine.    The following portions of the patient's history were reviewed and updated as appropriate: allergies, current medications, past family history, past medical history, past social history, past surgical history, and problem list.  Past Medical History:   Diagnosis Date    Diabetes mellitus 11/13/2020    HTN (hypertension) 11/13/2020    Hypercholesterolemia 11/13/2020    Hypothyroidism 11/13/2020    Takotsubo cardiomyopathy 11/13/2020     Past Surgical History:   Procedure Laterality Date    BACK SURGERY      CARDIAC CATHETERIZATION N/A 11/13/2020    Procedure: Left Heart Cath;  Surgeon: Luis Ambrosio MD;  Location: [facility] CATH INVASIVE LOCATION;  Service: Cardiology;  Laterality: N/A;    CARDIAC CATHETERIZATION N/A 11/13/2020    Procedure: Left ventriculography;  Surgeon: Luis Ambrosio MD;  Location: [facility] CATH INVASIVE LOCATION;  Service: Cardiology;  Laterality: N/A;    CARDIAC CATHETERIZATION N/A 11/13/2020    Procedure: Coronary angiography;  Surgeon: Luis Ambrosio MD;  Location: [facility] CATH INVASIVE LOCATION;  Service: Cardiology;  Laterality: N/A;    CHOLECYSTECTOMY      LAPAROSCOPIC TUBAL LIGATION      THROAT SURGERY       /73   Pulse  "60   Ht 162.6 cm (64.02\")   Wt 86.2 kg (190 lb)   SpO2 98%   BMI 32.60 kg/m²   Family History   Problem Relation Age of Onset    Heart disease Father     Heart disease Paternal Aunt     Heart disease Paternal Grandmother     Hypertension Paternal Grandfather        Current Outpatient Medications:     atorvastatin (LIPITOR) 80 MG tablet, Take 0.5 tablets by mouth Daily., Disp: , Rfl:     cetirizine (zyrTEC) 10 MG tablet, Take 1 tablet by mouth Daily., Disp: , Rfl:     cyclobenzaprine (FLEXERIL) 5 MG tablet, Take 1 tablet by mouth 3 (Three) Times a Day As Needed for Muscle Spasms., Disp: , Rfl:     levothyroxine (SYNTHROID, LEVOTHROID) 137 MCG tablet, Take 1 tablet by mouth Daily., Disp: , Rfl:     losartan (COZAAR) 50 MG tablet, Take 2 tablets by mouth Daily., Disp: 180 tablet, Rfl: 3    metFORMIN (GLUCOPHAGE) 1000 MG tablet, Take 1 tablet by mouth 2 (Two) Times a Day With Meals., Disp: , Rfl:     metoprolol succinate XL (TOPROL-XL) 50 MG 24 hr tablet, Take 1 tablet by mouth Daily., Disp: , Rfl:     montelukast (SINGULAIR) 4 MG pack, Take 1 packet by mouth Every Night., Disp: , Rfl:   Allergies   Allergen Reactions    Lisinopril Anaphylaxis    Naproxen Anaphylaxis    Nifedipine Anaphylaxis    Sulfa Antibiotics GI Intolerance     Social History     Socioeconomic History    Marital status:    Tobacco Use    Smoking status: Never     Passive exposure: Never    Smokeless tobacco: Never   Vaping Use    Vaping Use: Never used   Substance and Sexual Activity    Alcohol use: Not Currently    Drug use: Never    Sexual activity: Defer     Review of Systems   Constitutional: Negative for malaise/fatigue.   Cardiovascular:  Positive for leg swelling. Negative for chest pain, dyspnea on exertion and palpitations.   Respiratory:  Positive for shortness of breath. Negative for cough.    Gastrointestinal:  Negative for abdominal pain, nausea and vomiting.   Neurological:  Negative for dizziness, focal weakness, headaches, " light-headedness and numbness.   All other systems reviewed and are negative.             Objective:     Constitutional:       Appearance: Well-developed.   Eyes:      General: No scleral icterus.     Conjunctiva/sclera: Conjunctivae normal.   HENT:      Head: Normocephalic and atraumatic.   Neck:      Vascular: No carotid bruit or JVD.   Pulmonary:      Effort: Pulmonary effort is normal.      Breath sounds: Normal breath sounds. No wheezing. No rales.   Cardiovascular:      Normal rate. Regular rhythm.   Pulses:     Intact distal pulses.   Abdominal:      General: Bowel sounds are normal.      Palpations: Abdomen is soft.   Musculoskeletal:      Cervical back: Normal range of motion and neck supple. Skin:     General: Skin is warm and dry.      Findings: No rash.   Neurological:      Mental Status: Alert.       Procedures    Lab Review:         MDM    #1 stress-induced cardiomyopathy with congestive heart failure  Patient is currently stable with normal function and is on metoprolol and losartan  2.  Hypertension  Patient blood pressure very high and she has sleep apnea and I have asked her to check her blood pressure at home and I will adjust medicines accordingly  3.  Hyperlipidemia  Patient is on Lipitor and the lipid levels are followed by the primary care doctor  4.  Diabetes  Patient is on oral medicines and followed by the primary care doctor.    Patient's previous medical records, labs, and EKG were reviewed and discussed with the patient at today's visit.

## 2024-03-04 ENCOUNTER — APPOINTMENT (OUTPATIENT)
Dept: GENERAL RADIOLOGY | Facility: HOSPITAL | Age: 64
End: 2024-03-04
Payer: MEDICAID

## 2024-03-04 ENCOUNTER — HOSPITAL ENCOUNTER (OUTPATIENT)
Facility: HOSPITAL | Age: 64
Setting detail: OBSERVATION
Discharge: HOME | End: 2024-03-05
Attending: EMERGENCY MEDICINE | Admitting: EMERGENCY MEDICINE
Payer: MEDICAID

## 2024-03-04 DIAGNOSIS — R07.9 CHEST PAIN, UNSPECIFIED TYPE: Primary | ICD-10-CM

## 2024-03-04 DIAGNOSIS — R00.2 PALPITATIONS: ICD-10-CM

## 2024-03-04 DIAGNOSIS — R29.818 SUSPECTED SLEEP APNEA: ICD-10-CM

## 2024-03-04 LAB
ALBUMIN SERPL-MCNC: 4.2 G/DL (ref 3.5–5.2)
ALBUMIN/GLOB SERPL: 1.4 G/DL
ALP SERPL-CCNC: 91 U/L (ref 39–117)
ALT SERPL W P-5'-P-CCNC: 13 U/L (ref 1–33)
ANION GAP SERPL CALCULATED.3IONS-SCNC: 12 MMOL/L (ref 5–15)
ANION GAP SERPL CALCULATED.3IONS-SCNC: 13 MMOL/L (ref 5–15)
APTT PPP: 24.5 SECONDS (ref 24–31)
AST SERPL-CCNC: 16 U/L (ref 1–32)
BASOPHILS # BLD AUTO: 0.1 10*3/MM3 (ref 0–0.2)
BASOPHILS # BLD AUTO: 0.1 10*3/MM3 (ref 0–0.2)
BASOPHILS NFR BLD AUTO: 1 % (ref 0–1.5)
BASOPHILS NFR BLD AUTO: 1 % (ref 0–1.5)
BILIRUB SERPL-MCNC: 0.4 MG/DL (ref 0–1.2)
BUN SERPL-MCNC: 13 MG/DL (ref 8–23)
BUN SERPL-MCNC: 13 MG/DL (ref 8–23)
BUN/CREAT SERPL: 18.8 (ref 7–25)
BUN/CREAT SERPL: 19.7 (ref 7–25)
CALCIUM SPEC-SCNC: 8.9 MG/DL (ref 8.6–10.5)
CALCIUM SPEC-SCNC: 9.1 MG/DL (ref 8.6–10.5)
CHLORIDE SERPL-SCNC: 105 MMOL/L (ref 98–107)
CHLORIDE SERPL-SCNC: 106 MMOL/L (ref 98–107)
CO2 SERPL-SCNC: 24 MMOL/L (ref 22–29)
CO2 SERPL-SCNC: 26 MMOL/L (ref 22–29)
CREAT SERPL-MCNC: 0.66 MG/DL (ref 0.57–1)
CREAT SERPL-MCNC: 0.69 MG/DL (ref 0.57–1)
DEPRECATED RDW RBC AUTO: 45.1 FL (ref 37–54)
DEPRECATED RDW RBC AUTO: 47.7 FL (ref 37–54)
EGFRCR SERPLBLD CKD-EPI 2021: 97.1 ML/MIN/1.73
EGFRCR SERPLBLD CKD-EPI 2021: 98.1 ML/MIN/1.73
EOSINOPHIL # BLD AUTO: 0.4 10*3/MM3 (ref 0–0.4)
EOSINOPHIL # BLD AUTO: 0.4 10*3/MM3 (ref 0–0.4)
EOSINOPHIL NFR BLD AUTO: 6.1 % (ref 0.3–6.2)
EOSINOPHIL NFR BLD AUTO: 6.9 % (ref 0.3–6.2)
ERYTHROCYTE [DISTWIDTH] IN BLOOD BY AUTOMATED COUNT: 15.5 % (ref 12.3–15.4)
ERYTHROCYTE [DISTWIDTH] IN BLOOD BY AUTOMATED COUNT: 15.5 % (ref 12.3–15.4)
GEN 5 2HR TROPONIN T REFLEX: 8 NG/L
GLOBULIN UR ELPH-MCNC: 3.1 GM/DL
GLUCOSE BLDC GLUCOMTR-MCNC: 119 MG/DL (ref 70–105)
GLUCOSE BLDC GLUCOMTR-MCNC: 136 MG/DL (ref 70–105)
GLUCOSE BLDC GLUCOMTR-MCNC: 145 MG/DL (ref 70–105)
GLUCOSE BLDC GLUCOMTR-MCNC: 149 MG/DL (ref 70–105)
GLUCOSE SERPL-MCNC: 143 MG/DL (ref 65–99)
GLUCOSE SERPL-MCNC: 152 MG/DL (ref 65–99)
HBA1C MFR BLD: 7.7 % (ref 4.8–5.6)
HCT VFR BLD AUTO: 35.6 % (ref 34–46.6)
HCT VFR BLD AUTO: 36.7 % (ref 34–46.6)
HGB BLD-MCNC: 11.4 G/DL (ref 12–15.9)
HGB BLD-MCNC: 11.7 G/DL (ref 12–15.9)
INR PPP: <0.93 (ref 0.93–1.1)
LYMPHOCYTES # BLD AUTO: 2.7 10*3/MM3 (ref 0.7–3.1)
LYMPHOCYTES # BLD AUTO: 2.8 10*3/MM3 (ref 0.7–3.1)
LYMPHOCYTES NFR BLD AUTO: 40 % (ref 19.6–45.3)
LYMPHOCYTES NFR BLD AUTO: 42.3 % (ref 19.6–45.3)
MAGNESIUM SERPL-MCNC: 1.7 MG/DL (ref 1.6–2.4)
MCH RBC QN AUTO: 26.3 PG (ref 26.6–33)
MCH RBC QN AUTO: 26.6 PG (ref 26.6–33)
MCHC RBC AUTO-ENTMCNC: 31.9 G/DL (ref 31.5–35.7)
MCHC RBC AUTO-ENTMCNC: 32 G/DL (ref 31.5–35.7)
MCV RBC AUTO: 82.3 FL (ref 79–97)
MCV RBC AUTO: 83.2 FL (ref 79–97)
MONOCYTES # BLD AUTO: 0.5 10*3/MM3 (ref 0.1–0.9)
MONOCYTES # BLD AUTO: 0.5 10*3/MM3 (ref 0.1–0.9)
MONOCYTES NFR BLD AUTO: 7.6 % (ref 5–12)
MONOCYTES NFR BLD AUTO: 8 % (ref 5–12)
NEUTROPHILS NFR BLD AUTO: 2.7 10*3/MM3 (ref 1.7–7)
NEUTROPHILS NFR BLD AUTO: 3.2 10*3/MM3 (ref 1.7–7)
NEUTROPHILS NFR BLD AUTO: 41.8 % (ref 42.7–76)
NEUTROPHILS NFR BLD AUTO: 45.3 % (ref 42.7–76)
NRBC BLD AUTO-RTO: 0 /100 WBC (ref 0–0.2)
NRBC BLD AUTO-RTO: 0 /100 WBC (ref 0–0.2)
NT-PROBNP SERPL-MCNC: 44.9 PG/ML (ref 0–900)
PLATELET # BLD AUTO: 278 10*3/MM3 (ref 140–450)
PLATELET # BLD AUTO: 285 10*3/MM3 (ref 140–450)
PMV BLD AUTO: 8.5 FL (ref 6–12)
PMV BLD AUTO: 8.8 FL (ref 6–12)
POTASSIUM SERPL-SCNC: 3.6 MMOL/L (ref 3.5–5.2)
POTASSIUM SERPL-SCNC: 3.6 MMOL/L (ref 3.5–5.2)
PROT SERPL-MCNC: 7.3 G/DL (ref 6–8.5)
PROTHROMBIN TIME: 9.8 SECONDS (ref 9.6–11.7)
QT INTERVAL: 383 MS
QTC INTERVAL: 488 MS
RBC # BLD AUTO: 4.29 10*6/MM3 (ref 3.77–5.28)
RBC # BLD AUTO: 4.46 10*6/MM3 (ref 3.77–5.28)
SODIUM SERPL-SCNC: 143 MMOL/L (ref 136–145)
SODIUM SERPL-SCNC: 143 MMOL/L (ref 136–145)
TROPONIN T DELTA: -2 NG/L
TROPONIN T SERPL HS-MCNC: 10 NG/L
TSH SERPL DL<=0.05 MIU/L-ACNC: 2.89 UIU/ML (ref 0.27–4.2)
WBC NRBC COR # BLD AUTO: 6.4 10*3/MM3 (ref 3.4–10.8)
WBC NRBC COR # BLD AUTO: 7 10*3/MM3 (ref 3.4–10.8)

## 2024-03-04 PROCEDURE — 93005 ELECTROCARDIOGRAM TRACING: CPT

## 2024-03-04 PROCEDURE — 80048 BASIC METABOLIC PNL TOTAL CA: CPT | Performed by: EMERGENCY MEDICINE

## 2024-03-04 PROCEDURE — 85730 THROMBOPLASTIN TIME PARTIAL: CPT | Performed by: NURSE PRACTITIONER

## 2024-03-04 PROCEDURE — G0378 HOSPITAL OBSERVATION PER HR: HCPCS

## 2024-03-04 PROCEDURE — 83036 HEMOGLOBIN GLYCOSYLATED A1C: CPT | Performed by: NURSE PRACTITIONER

## 2024-03-04 PROCEDURE — 36415 COLL VENOUS BLD VENIPUNCTURE: CPT

## 2024-03-04 PROCEDURE — 83880 ASSAY OF NATRIURETIC PEPTIDE: CPT | Performed by: NURSE PRACTITIONER

## 2024-03-04 PROCEDURE — 80050 GENERAL HEALTH PANEL: CPT | Performed by: NURSE PRACTITIONER

## 2024-03-04 PROCEDURE — 71045 X-RAY EXAM CHEST 1 VIEW: CPT

## 2024-03-04 PROCEDURE — 99214 OFFICE O/P EST MOD 30 MIN: CPT | Performed by: INTERNAL MEDICINE

## 2024-03-04 PROCEDURE — 85610 PROTHROMBIN TIME: CPT | Performed by: NURSE PRACTITIONER

## 2024-03-04 PROCEDURE — 99285 EMERGENCY DEPT VISIT HI MDM: CPT

## 2024-03-04 PROCEDURE — 83735 ASSAY OF MAGNESIUM: CPT | Performed by: NURSE PRACTITIONER

## 2024-03-04 PROCEDURE — 93005 ELECTROCARDIOGRAM TRACING: CPT | Performed by: EMERGENCY MEDICINE

## 2024-03-04 PROCEDURE — 82948 REAGENT STRIP/BLOOD GLUCOSE: CPT

## 2024-03-04 PROCEDURE — 85025 COMPLETE CBC W/AUTO DIFF WBC: CPT | Performed by: EMERGENCY MEDICINE

## 2024-03-04 PROCEDURE — 84484 ASSAY OF TROPONIN QUANT: CPT | Performed by: NURSE PRACTITIONER

## 2024-03-04 RX ORDER — DEXTROSE MONOHYDRATE 25 G/50ML
25 INJECTION, SOLUTION INTRAVENOUS
Status: DISCONTINUED | OUTPATIENT
Start: 2024-03-04 | End: 2024-03-05 | Stop reason: HOSPADM

## 2024-03-04 RX ORDER — MORPHINE SULFATE 2 MG/ML
2 INJECTION, SOLUTION INTRAMUSCULAR; INTRAVENOUS EVERY 4 HOURS PRN
Status: DISCONTINUED | OUTPATIENT
Start: 2024-03-04 | End: 2024-03-05 | Stop reason: HOSPADM

## 2024-03-04 RX ORDER — CHOLECALCIFEROL (VITAMIN D3) 125 MCG
5 CAPSULE ORAL NIGHTLY PRN
Status: DISCONTINUED | OUTPATIENT
Start: 2024-03-04 | End: 2024-03-05 | Stop reason: HOSPADM

## 2024-03-04 RX ORDER — NICOTINE POLACRILEX 4 MG
15 LOZENGE BUCCAL
Status: DISCONTINUED | OUTPATIENT
Start: 2024-03-04 | End: 2024-03-05 | Stop reason: HOSPADM

## 2024-03-04 RX ORDER — ACETAMINOPHEN 325 MG/1
650 TABLET ORAL EVERY 4 HOURS PRN
Status: DISCONTINUED | OUTPATIENT
Start: 2024-03-04 | End: 2024-03-05 | Stop reason: HOSPADM

## 2024-03-04 RX ORDER — BISACODYL 10 MG
10 SUPPOSITORY, RECTAL RECTAL DAILY PRN
Status: DISCONTINUED | OUTPATIENT
Start: 2024-03-04 | End: 2024-03-05 | Stop reason: HOSPADM

## 2024-03-04 RX ORDER — SODIUM CHLORIDE 0.9 % (FLUSH) 0.9 %
10 SYRINGE (ML) INJECTION AS NEEDED
Status: DISCONTINUED | OUTPATIENT
Start: 2024-03-04 | End: 2024-03-05 | Stop reason: HOSPADM

## 2024-03-04 RX ORDER — LOVASTATIN 40 MG/1
40 TABLET ORAL NIGHTLY
COMMUNITY

## 2024-03-04 RX ORDER — INSULIN LISPRO 100 [IU]/ML
2-9 INJECTION, SOLUTION INTRAVENOUS; SUBCUTANEOUS
Status: DISCONTINUED | OUTPATIENT
Start: 2024-03-04 | End: 2024-03-05 | Stop reason: HOSPADM

## 2024-03-04 RX ORDER — AMOXICILLIN 250 MG
2 CAPSULE ORAL 2 TIMES DAILY PRN
Status: DISCONTINUED | OUTPATIENT
Start: 2024-03-04 | End: 2024-03-05 | Stop reason: HOSPADM

## 2024-03-04 RX ORDER — MONTELUKAST SODIUM 10 MG/1
10 TABLET ORAL NIGHTLY
COMMUNITY

## 2024-03-04 RX ORDER — METFORMIN HYDROCHLORIDE 500 MG/1
1000 TABLET, EXTENDED RELEASE ORAL 2 TIMES DAILY
COMMUNITY

## 2024-03-04 RX ORDER — POLYETHYLENE GLYCOL 3350 17 G/17G
17 POWDER, FOR SOLUTION ORAL DAILY PRN
Status: DISCONTINUED | OUTPATIENT
Start: 2024-03-04 | End: 2024-03-05 | Stop reason: HOSPADM

## 2024-03-04 RX ORDER — LOSARTAN POTASSIUM 50 MG/1
100 TABLET ORAL DAILY
Status: DISCONTINUED | OUTPATIENT
Start: 2024-03-04 | End: 2024-03-05 | Stop reason: HOSPADM

## 2024-03-04 RX ORDER — BISACODYL 5 MG/1
5 TABLET, DELAYED RELEASE ORAL DAILY PRN
Status: DISCONTINUED | OUTPATIENT
Start: 2024-03-04 | End: 2024-03-05 | Stop reason: HOSPADM

## 2024-03-04 RX ORDER — IBUPROFEN 600 MG/1
1 TABLET ORAL
Status: DISCONTINUED | OUTPATIENT
Start: 2024-03-04 | End: 2024-03-05 | Stop reason: HOSPADM

## 2024-03-04 RX ORDER — METOPROLOL SUCCINATE 50 MG/1
50 TABLET, EXTENDED RELEASE ORAL DAILY
Status: DISCONTINUED | OUTPATIENT
Start: 2024-03-04 | End: 2024-03-05 | Stop reason: HOSPADM

## 2024-03-04 RX ORDER — ATORVASTATIN CALCIUM 10 MG/1
10 TABLET, FILM COATED ORAL DAILY
Status: DISCONTINUED | OUTPATIENT
Start: 2024-03-04 | End: 2024-03-05 | Stop reason: HOSPADM

## 2024-03-04 RX ORDER — SODIUM CHLORIDE 9 MG/ML
40 INJECTION, SOLUTION INTRAVENOUS AS NEEDED
Status: DISCONTINUED | OUTPATIENT
Start: 2024-03-04 | End: 2024-03-05 | Stop reason: HOSPADM

## 2024-03-04 RX ORDER — ALUMINA, MAGNESIA, AND SIMETHICONE 2400; 2400; 240 MG/30ML; MG/30ML; MG/30ML
15 SUSPENSION ORAL EVERY 6 HOURS PRN
Status: DISCONTINUED | OUTPATIENT
Start: 2024-03-04 | End: 2024-03-05 | Stop reason: HOSPADM

## 2024-03-04 RX ORDER — SODIUM CHLORIDE 0.9 % (FLUSH) 0.9 %
10 SYRINGE (ML) INJECTION EVERY 12 HOURS SCHEDULED
Status: DISCONTINUED | OUTPATIENT
Start: 2024-03-04 | End: 2024-03-05 | Stop reason: HOSPADM

## 2024-03-04 RX ORDER — ONDANSETRON 2 MG/ML
4 INJECTION INTRAMUSCULAR; INTRAVENOUS EVERY 6 HOURS PRN
Status: DISCONTINUED | OUTPATIENT
Start: 2024-03-04 | End: 2024-03-05 | Stop reason: HOSPADM

## 2024-03-04 RX ORDER — MONTELUKAST SODIUM 10 MG/1
10 TABLET ORAL NIGHTLY
Status: DISCONTINUED | OUTPATIENT
Start: 2024-03-04 | End: 2024-03-05 | Stop reason: HOSPADM

## 2024-03-04 RX ADMIN — LEVOTHYROXINE SODIUM 137 MCG: 0.11 TABLET ORAL at 09:01

## 2024-03-04 RX ADMIN — Medication 10 ML: at 09:01

## 2024-03-04 RX ADMIN — METOPROLOL SUCCINATE 50 MG: 50 TABLET, EXTENDED RELEASE ORAL at 09:01

## 2024-03-04 RX ADMIN — ATORVASTATIN CALCIUM 10 MG: 10 TABLET, FILM COATED ORAL at 09:01

## 2024-03-04 RX ADMIN — Medication 10 ML: at 21:01

## 2024-03-04 RX ADMIN — LOSARTAN POTASSIUM 100 MG: 50 TABLET, FILM COATED ORAL at 09:01

## 2024-03-04 NOTE — CONSULTS
CARDIOLOGY CONSULT:    Jaye Flores  1960  female  [id number]      Referring Provider: Hospitalist  Reason for Consultation: Chest pain and palpitation    Patient Care Team:  Srinivas Osman APRN as PCP - General (Nurse Practitioner)  Luis Ambrosio MD as Consulting Physician (Cardiology)    Chief complaint chest pain and palpitations    Subjective .     History of present illness:  Jaye Flores is a 64 y.o. female with history of hypertension diabetes hyperlipidemia and history of paroxysmal cardiomyopathy in the past presented to the hospital with complaints of chest pain and palpitations on and off for the last few days.  Chest pain is mostly left-sided with shortness of breath.  No complaint of any PND orthopnea.  No dizziness syncope or swelling of the feet.  Patient has been taking her medicines regular.  Recently she had her blood pressure medicines also adjusted.  She has sleep apnea but does not have the machine yet    Review of Systems   Constitutional: Negative for fever and malaise/fatigue.   HENT:  Negative for ear pain and nosebleeds.    Eyes:  Negative for blurred vision and double vision.   Cardiovascular:  Positive for chest pain and palpitations. Negative for dyspnea on exertion.   Respiratory:  Positive for shortness of breath. Negative for cough.    Skin:  Negative for rash.   Musculoskeletal:  Negative for joint pain.   Gastrointestinal:  Negative for abdominal pain, nausea and vomiting.   Neurological:  Negative for focal weakness and headaches.   Psychiatric/Behavioral:  Negative for depression. The patient is not nervous/anxious.    All other systems reviewed and are negative.      History  Past Medical History:   Diagnosis Date    Diabetes mellitus 11/13/2020    HTN (hypertension) 11/13/2020    Hypercholesterolemia 11/13/2020    Hypothyroidism 11/13/2020    Takotsubo cardiomyopathy 11/13/2020       Past Surgical History:   Procedure Laterality Date    BACK SURGERY       CARDIAC CATHETERIZATION N/A 11/13/2020    Procedure: Left Heart Cath;  Surgeon: Luis Ambrosio MD;  Location: [facility] CATH INVASIVE LOCATION;  Service: Cardiology;  Laterality: N/A;    CARDIAC CATHETERIZATION N/A 11/13/2020    Procedure: Left ventriculography;  Surgeon: Luis Ambrosio MD;  Location: [facility] CATH INVASIVE LOCATION;  Service: Cardiology;  Laterality: N/A;    CARDIAC CATHETERIZATION N/A 11/13/2020    Procedure: Coronary angiography;  Surgeon: Luis Ambrosio MD;  Location: [facility] CATH INVASIVE LOCATION;  Service: Cardiology;  Laterality: N/A;    CHOLECYSTECTOMY      LAPAROSCOPIC TUBAL LIGATION      THROAT SURGERY       Family History   Problem Relation Age of Onset    Heart disease Father     Heart disease Paternal Aunt     Heart disease Paternal Grandmother     Hypertension Paternal Grandfather        Social History     Tobacco Use    Smoking status: Never     Passive exposure: Never    Smokeless tobacco: Never   Vaping Use    Vaping status: Never Used   Substance Use Topics    Alcohol use: Not Currently    Drug use: Never        Medications Prior to Admission   Medication Sig Dispense Refill Last Dose    levothyroxine (SYNTHROID, LEVOTHROID) 137 MCG tablet Take 1 tablet by mouth Daily.       losartan (COZAAR) 100 MG tablet Take 1 tablet by mouth Daily.       lovastatin (MEVACOR) 40 MG tablet Take 1 tablet by mouth Every Night.       metFORMIN ER (GLUCOPHAGE-XR) 500 MG 24 hr tablet Take 2 tablets by mouth 2 (Two) Times a Day.       metoprolol succinate XL (TOPROL-XL) 50 MG 24 hr tablet Take 1 tablet by mouth Daily.       montelukast (SINGULAIR) 10 MG tablet Take 1 tablet by mouth Every Night.          Allergies: Lisinopril, Naproxen, Nifedipine, and Sulfa antibiotics    Scheduled Meds:atorvastatin, 10 mg, Oral, Daily  insulin lispro, 2-9 Units, Subcutaneous, 4x Daily AC & at Bedtime  levothyroxine, 137 mcg, Oral, Q AM  losartan, 100 mg, Oral, Daily  metoprolol succinate XL, 50 mg, Oral,  "Daily  montelukast, 10 mg, Oral, Nightly  sodium chloride, 10 mL, Intravenous, Q12H      Continuous Infusions:   PRN Meds:.  acetaminophen    aluminum-magnesium hydroxide-simethicone    senna-docusate sodium **AND** polyethylene glycol **AND** bisacodyl **AND** bisacodyl    dextrose    dextrose    glucagon (human recombinant)    melatonin    Morphine    ondansetron    [COMPLETED] Insert Peripheral IV **AND** sodium chloride    sodium chloride    sodium chloride    Objective     VITAL SIGNS  Vitals:    03/04/24 0750 03/04/24 1053 03/04/24 1501 03/04/24 1725   BP: 141/74 150/75 153/82 139/87   BP Location: Right arm Left arm Left arm Left arm   Patient Position: Sitting Sitting Lying Lying   Pulse: 83 74 76 79   Resp: 20 11 14 14   Temp:  97.6 °F (36.4 °C) 98.2 °F (36.8 °C) 98.3 °F (36.8 °C)   TempSrc:  Oral Oral Oral   SpO2: 96% 96% 95% 96%   Weight:       Height:           Flowsheet Rows      Flowsheet Row First Filed Value   Admission Height 160 cm (63\") Documented at 03/04/2024 0049   Admission Weight 85.9 kg (189 lb 6 oz) Documented at 03/04/2024 0049             TELEMETRY: Sinus rhythm with nonspecific st abnormality    Physical Exam:  Constitutional:       Appearance: Well-developed.   Eyes:      General: No scleral icterus.     Conjunctiva/sclera: Conjunctivae normal.      Pupils: Pupils are equal, round, and reactive to light.   HENT:      Head: Normocephalic and atraumatic.   Neck:      Vascular: No carotid bruit or JVD.   Pulmonary:      Effort: Pulmonary effort is normal.      Breath sounds: Normal breath sounds. No wheezing. No rales.   Cardiovascular:      Normal rate. Regular rhythm.   Pulses:     Intact distal pulses.   Abdominal:      General: Bowel sounds are normal.      Palpations: Abdomen is soft.   Musculoskeletal: Normal range of motion.      Cervical back: Normal range of motion and neck supple. Skin:     General: Skin is warm and dry.      Findings: No rash.   Neurological:      Mental Status: " Alert.      Comments: No focal deficits          Results Review:   I reviewed the patient's new clinical results.  Lab Results (last 24 hours)       Procedure Component Value Units Date/Time    POC Glucose Once [[id number]]  (Abnormal) Collected: 03/04/24 1607    Specimen: Blood Updated: 03/04/24 1609     Glucose 136 mg/dL      Comment: Serial Number: [id number]:  [id number]       Hemoglobin A1c [[id number]]  (Abnormal) Collected: 03/04/24 0435    Specimen: Blood Updated: 03/04/24 1328     Hemoglobin A1C 7.70 %     POC Glucose Once [[id number]]  (Abnormal) Collected: 03/04/24 1129    Specimen: Blood Updated: 03/04/24 1132     Glucose 119 mg/dL      Comment: Serial Number: [id number]:  [id number]       POC Glucose Once [[id number]]  (Abnormal) Collected: 03/04/24 0903    Specimen: Blood Updated: 03/04/24 0905     Glucose 145 mg/dL      Comment: Serial Number: [id number]:  [id number]       High Sensitivity Troponin T 2Hr [[id number]]  (Normal) Collected: 03/04/24 0435    Specimen: Blood Updated: 03/04/24 0514     HS Troponin T 8 ng/L      Troponin T Delta -2 ng/L     Narrative:      High Sensitive Troponin T Reference Range:  <14.0 ng/L- Negative Female for AMI  <22.0 ng/L- Negative Male for AMI  >=14 - Abnormal Female indicating possible myocardial injury.  >=22 - Abnormal Male indicating possible myocardial injury.   Clinicians would have to utilize clinical acumen, EKG, Troponin, and serial changes to determine if it is an Acute Myocardial Infarction or myocardial injury due to an underlying chronic condition.         Basic Metabolic Panel [[id number]]  (Abnormal) Collected: 03/04/24 0435    Specimen: Blood Updated: 03/04/24 0514     Glucose 143 mg/dL      BUN 13 mg/dL      Creatinine 0.66 mg/dL      Sodium 143 mmol/L      Potassium 3.6 mmol/L      Chloride 106 mmol/L      CO2 24.0 mmol/L      Calcium 8.9 mg/dL      BUN/Creatinine Ratio 19.7     Anion Gap 13.0 mmol/L      eGFR 98.1 mL/min/1.73      Narrative:      GFR Normal >60  Chronic Kidney Disease <60  Kidney Failure <15      CBC Auto Differential [[id number]]  (Abnormal) Collected: 03/04/24 0435    Specimen: Blood Updated: 03/04/24 0454     WBC 6.40 10*3/mm3      RBC 4.29 10*6/mm3      Hemoglobin 11.4 g/dL      Hematocrit 35.6 %      MCV 83.2 fL      MCH 26.6 pg      MCHC 32.0 g/dL      RDW 15.5 %      RDW-SD 45.1 fl      MPV 8.8 fL      Platelets 278 10*3/mm3      Neutrophil % 41.8 %      Lymphocyte % 42.3 %      Monocyte % 8.0 %      Eosinophil % 6.9 %      Basophil % 1.0 %      Neutrophils, Absolute 2.70 10*3/mm3      Lymphocytes, Absolute 2.70 10*3/mm3      Monocytes, Absolute 0.50 10*3/mm3      Eosinophils, Absolute 0.40 10*3/mm3      Basophils, Absolute 0.10 10*3/mm3      nRBC 0.0 /100 WBC     BNP [[id number]]  (Normal) Collected: 03/04/24 0214    Specimen: Blood Updated: 03/04/24 0249     proBNP 44.9 pg/mL     Narrative:      This assay is used as an aid in the diagnosis of individuals suspected of having heart failure. It can be used as an aid in the diagnosis of acute decompensated heart failure (ADHF) in patients presenting with signs and symptoms of ADHF to the emergency department (ED). In addition, NT-proBNP of <300 pg/mL indicates ADHF is not likely.    Age Range Result Interpretation  NT-proBNP Concentration (pg/mL:      <50             Positive            >450                   Gray                 300-450                    Negative             <300    50-75           Positive            >900                  Gray                300-900                  Negative            <300      >75             Positive            >1800                  Gray                300-1800                  Negative            <300    High Sensitivity Troponin T [[id number]]  (Normal) Collected: 03/04/24 0214    Specimen: Blood Updated: 03/04/24 0249     HS Troponin T 10 ng/L     Narrative:      High Sensitive Troponin T Reference Range:  <14.0 ng/L- Negative  Female for AMI  <22.0 ng/L- Negative Male for AMI  >=14 - Abnormal Female indicating possible myocardial injury.  >=22 - Abnormal Male indicating possible myocardial injury.   Clinicians would have to utilize clinical acumen, EKG, Troponin, and serial changes to determine if it is an Acute Myocardial Infarction or myocardial injury due to an underlying chronic condition.         TSH [[id number]]  (Normal) Collected: 03/04/24 0214    Specimen: Blood Updated: 03/04/24 0249     TSH 2.890 uIU/mL     aPTT [[id number]]  (Normal) Collected: 03/04/24 0214    Specimen: Blood Updated: 03/04/24 0246     PTT 24.5 seconds     Protime-INR [[id number]]  (Abnormal) Collected: 03/04/24 0214    Specimen: Blood Updated: 03/04/24 0246     Protime 9.8 Seconds      INR <0.93    Comprehensive Metabolic Panel [[id number]]  (Abnormal) Collected: 03/04/24 0214    Specimen: Blood Updated: 03/04/24 0245     Glucose 152 mg/dL      BUN 13 mg/dL      Creatinine 0.69 mg/dL      Sodium 143 mmol/L      Potassium 3.6 mmol/L      Comment: Slight hemolysis detected by analyzer. Result may be falsely elevated.        Chloride 105 mmol/L      CO2 26.0 mmol/L      Calcium 9.1 mg/dL      Total Protein 7.3 g/dL      Albumin 4.2 g/dL      ALT (SGPT) 13 U/L      AST (SGOT) 16 U/L      Alkaline Phosphatase 91 U/L      Total Bilirubin 0.4 mg/dL      Globulin 3.1 gm/dL      A/G Ratio 1.4 g/dL      BUN/Creatinine Ratio 18.8     Anion Gap 12.0 mmol/L      eGFR 97.1 mL/min/1.73     Narrative:      GFR Normal >60  Chronic Kidney Disease <60  Kidney Failure <15      Magnesium [[id number]]  (Normal) Collected: 03/04/24 0214    Specimen: Blood Updated: 03/04/24 0245     Magnesium 1.7 mg/dL     CBC & Differential [[id number]]  (Abnormal) Collected: 03/04/24 0214    Specimen: Blood Updated: 03/04/24 0218    Narrative:      The following orders were created for panel order CBC & Differential.  Procedure                               Abnormality         Status                      ---------                               -----------         ------                     CBC Auto Differential[[id number]]        Abnormal            Final result                 Please view results for these tests on the individual orders.    CBC Auto Differential [[id number]]  (Abnormal) Collected: 03/04/24 0214    Specimen: Blood Updated: 03/04/24 0218     WBC 7.00 10*3/mm3      RBC 4.46 10*6/mm3      Hemoglobin 11.7 g/dL      Hematocrit 36.7 %      MCV 82.3 fL      MCH 26.3 pg      MCHC 31.9 g/dL      RDW 15.5 %      RDW-SD 47.7 fl      MPV 8.5 fL      Platelets 285 10*3/mm3      Neutrophil % 45.3 %      Lymphocyte % 40.0 %      Monocyte % 7.6 %      Eosinophil % 6.1 %      Basophil % 1.0 %      Neutrophils, Absolute 3.20 10*3/mm3      Lymphocytes, Absolute 2.80 10*3/mm3      Monocytes, Absolute 0.50 10*3/mm3      Eosinophils, Absolute 0.40 10*3/mm3      Basophils, Absolute 0.10 10*3/mm3      nRBC 0.0 /100 WBC             Imaging Results (Last 24 Hours)       Procedure Component Value Units Date/Time    XR Chest 1 View [[id number]] Collected: 03/04/24 0230     Updated: 03/04/24 0233    Narrative:      XR CHEST 1 VW    Date of Exam: 3/4/2024 1:59 AM EST    Indication: chest tightness    Comparison: 11/13/2020.    Findings:  There is no pneumothorax, pleural effusion or focal airspace consolidation. Heart size and pulmonary vasculature appear within normal limits. Regional bones appear intact.      Impression:      Impression:  No acute cardiopulmonary abnormality.        Electronically Signed: Jeferson Ford MD    3/4/2024 2:31 AM EST    Workstation ID: [id number]            EKG      I personally viewed and interpreted the patient's EKG/Telemetry data:    ECHOCARDIOGRAM:  Results for orders placed during the hospital encounter of 05/13/22    Adult Transthoracic Echo Complete W/ Cont if Necessary Per Protocol    Interpretation Summary  · Left ventricular ejection fraction appears to be 56 - 60%.  · The right  ventricular cavity is mildly dilated.  · There is calcification of the aortic valve.  · No pericardial effusion noted         STRESS MYOVIEW:       CARDIAC CATHETERIZATION:    Results for orders placed during the hospital encounter of 20    Cardiac Catheterization/Vascular Study    Narrative  Heart Cath Report    NAME:              Caty Barnes  :                1960  AGE/SEX:        60 y.o. female  MRN:                [id number]  ADM DATE:      [unfilled]  DOS:      Pre-Procedure Notes  H&P Performed  [x]  Yes []  No       []  N/A    Indications:  [x]  ACS <= 24 HRS  []  ACS >24 HRS  [x]  New Onset Angina <= 2 mos  []  Worsening Angina  []  Resuscitated Cardiac Arrest  []  Angina on Exertion:  []  Suspected CAD  []  Valvular Disease  []  Pericardial Disease  []  Cardiac Arrythmia  []  Cardiomyopathy  []  LV Dysfunction  []  Syncope  []  Post Cardiac Transplant  []  Eval. For Exercise Clearance  []  Abnormal Stress Test  []  Other  []  Pre-Operative Evaluation  If Pre-Op Eval:  Evaluation for Surgery Type:  []  Cardiac Surgery   []  Non-Cardiac Surgery  Functional Capacity:  []  <4 METS  []  >=4 METS w/o symptoms  []  >= 4 METS with symptoms  []  Unknown  Surgical Risk:  []  Low  []  Intermediate  []  High Risk: Vascular  []  High Risk Non-Vascular    Risks, Benefits, & Complications Discussed:  [x]  Yes  []  No  []  N/A    Questions Answered:  [x]  Yes  []  No  []  N/A    Consent Obtained:  [x]  Yes  []  No  []  N/A    CHF: [x]  Yes  []  No  If Yes:  Newly Diagnosed?  []  Yes  []  No  If Yes:  HF Type:  []  Diastolic  [x]  Systolic  []  Unknown      Procedure Description  The patient underwent successful [x]  Left  []  Right  []  Left & Right  Heart catheterization and coronary angiography via the  [x]  Femoral approach  []  Radial approach  []  Brachial approach    Procedure Narrative:  Informed consent was obtained from the patient after explaining risks and benefits.  Patient was brought to the  cardiac catheterization laboratory and placed on the table in the usual fashion.  Right groin was shaved and prepped in sterile fashion. Moderate conscious sedation was given utilizing IV Versed and fentanyl administered by RN with continuous EKG oximetry and hemodynamic monitoring supervised by me throughout the entire case, conscious sedation time was 30 minutes.  I was present with the patient for the duration of moderate sedation and supervised staff who had no other duties and monitored the patient for the entire procedure patient had Martinez 2-3 sedation scale. 2% lidocaine was used for local anesthesia to the right groin.  A total of 20 cc was used.  A 6 Czech sheath was placed in the right femoral artery.  A 6 Czech pigtail catheter, 6 Czech JR4 catheter and a 6 Czech JL4 catheter was used for the procedure.  After the cardiac catheterization is complete, all the catheters and sheath were removed.  Patient tolerated the procedure very well.  No complications noted.      Hemodynamic    LVEDP:8   Estimated EF %: 30 to 35%    Initial Aortic Pressure: 150/80    AV Gradient: No gradient  Rt. Heart Pressure:    Wall Motion:  Dominance:  []  Left  [x]  Right  []  Co-Dominant    Coronary Arteriography: (Please Code highest degree of stenosis)    Left Main %:   0  Proximal LAD %:  0  Mid/Distal LAD %:  0  LCX %: 0  Ramus:  RCA %: 0  Lima %:  SVG(s) %:      Complications: No complications    Estimated Blood Loss:  None      Impression and Recommendation: Normal coronaries  Takotsubo cardiomyopathy with LV dysfunction  Continue medical therapy    Electronically signed by Luis Ambrosio MD, 11/13/20, 1:45 PM EST       OTHER:         MDM      Angina  Patient presented with chest pain and palpitations and is ruled out for MI by EKG and enzymes  Patient had an echo recently which showed normal LV function  Patient will have a stress Myoview study to rule out ischemia    Hypertension  Patient blood pressure better now  with Cozaar and metoprolol      Diabetes  Patient's sugar levels are still high and will adjust the medicines by the primary care doctor    *Hyperlipidemia  Patient is on statins and the lipid levels are well within normal limits    Sleep apnea  Patient has sleep apnea and will need a sleep study in the machine.    I discussed the patients findings and my recommendations with patient and nurse    Luis Ambrosio MD  03/04/24  18:05 EST

## 2024-03-04 NOTE — ED PROVIDER NOTES
Subjective   History of Present Illness  Patient is a 64-year-old white female with history of hypertension, hyperlipidemia, diabetes, Takotsubo cardiomyopathy.  She presents today from home with complaints of palpitations chest tightness and discomfort in her left arm.  States this been an ongoing issue intermittently last month or 2 but she feels like it is getting worse.  No associated shortness of breath.  She states this wakes her from her sleep at night.      Review of Systems   Constitutional:  Negative for fever.   Respiratory:  Positive for chest tightness. Negative for shortness of breath.    Cardiovascular:  Positive for palpitations. Negative for chest pain and leg swelling.   Gastrointestinal:  Negative for diarrhea and vomiting.       Past Medical History:   Diagnosis Date    Diabetes mellitus 11/13/2020    HTN (hypertension) 11/13/2020    Hypercholesterolemia 11/13/2020    Hypothyroidism 11/13/2020    Takotsubo cardiomyopathy 11/13/2020       Allergies   Allergen Reactions    Lisinopril Anaphylaxis    Naproxen Anaphylaxis    Nifedipine Anaphylaxis    Sulfa Antibiotics GI Intolerance       Past Surgical History:   Procedure Laterality Date    BACK SURGERY      CARDIAC CATHETERIZATION N/A 11/13/2020    Procedure: Left Heart Cath;  Surgeon: Luis Ambrosio MD;  Location: [facility] CATH INVASIVE LOCATION;  Service: Cardiology;  Laterality: N/A;    CARDIAC CATHETERIZATION N/A 11/13/2020    Procedure: Left ventriculography;  Surgeon: Luis Ambrosio MD;  Location: [facility] CATH INVASIVE LOCATION;  Service: Cardiology;  Laterality: N/A;    CARDIAC CATHETERIZATION N/A 11/13/2020    Procedure: Coronary angiography;  Surgeon: Luis Ambrosio MD;  Location: [facility] CATH INVASIVE LOCATION;  Service: Cardiology;  Laterality: N/A;    CHOLECYSTECTOMY      LAPAROSCOPIC TUBAL LIGATION      THROAT SURGERY         Family History   Problem Relation Age of Onset    Heart disease Father     Heart disease Paternal Aunt     Heart  disease Paternal Grandmother     Hypertension Paternal Grandfather        Social History     Socioeconomic History    Marital status:    Tobacco Use    Smoking status: Never     Passive exposure: Never    Smokeless tobacco: Never   Vaping Use    Vaping status: Never Used   Substance and Sexual Activity    Alcohol use: Not Currently    Drug use: Never    Sexual activity: Defer           Objective   Physical Exam  Vital signs and triage nurse note reviewed.  Constitutional: Awake, alert; well-developed and well-nourished. No acute distress is noted.  HEENT: Normocephalic, atraumatic; pupils are PERRL with intact EOM; oropharynx is pink and moist without exudate or erythema.  No drooling or pooling of oral secretions.  Neck: Supple, full range of motion without pain; no cervical lymphadenopathy. Normal phonation.  Cardiovascular: Regular rate and rhythm, normal S1-S2.  No murmur noted.  Pulmonary: Respiratory effort regular nonlabored, breath sounds clear to auscultation all fields.  Abdomen: Soft, nontender, nondistended with normoactive bowel sounds; no rebound or guarding.  Musculoskeletal: Independent range of motion of all extremities with no palpable tenderness or edema.  Neuro: Alert oriented x3, speech is clear and appropriate, GCS 15.    Skin: Flesh tone, warm, dry, intact; no erythematous or petechial rash or lesion.    Procedures           ED Course      Labs Reviewed   COMPREHENSIVE METABOLIC PANEL - Abnormal; Notable for the following components:       Result Value    Glucose 152 (*)     All other components within normal limits    Narrative:     GFR Normal >60  Chronic Kidney Disease <60  Kidney Failure <15     PROTIME-INR - Abnormal; Notable for the following components:    INR <0.93 (*)     All other components within normal limits   CBC WITH AUTO DIFFERENTIAL - Abnormal; Notable for the following components:    Hemoglobin 11.7 (*)     MCH 26.3 (*)     RDW 15.5 (*)     All other components within  normal limits   APTT - Normal   BNP (IN-HOUSE) - Normal    Narrative:     This assay is used as an aid in the diagnosis of individuals suspected of having heart failure. It can be used as an aid in the diagnosis of acute decompensated heart failure (ADHF) in patients presenting with signs and symptoms of ADHF to the emergency department (ED). In addition, NT-proBNP of <300 pg/mL indicates ADHF is not likely.    Age Range Result Interpretation  NT-proBNP Concentration (pg/mL:      <50             Positive            >450                   Gray                 300-450                    Negative             <300    50-75           Positive            >900                  Gray                300-900                  Negative            <300      >75             Positive            >1800                  Gray                300-1800                  Negative            <300   TROPONIN - Normal    Narrative:     High Sensitive Troponin T Reference Range:  <14.0 ng/L- Negative Female for AMI  <22.0 ng/L- Negative Male for AMI  >=14 - Abnormal Female indicating possible myocardial injury.  >=22 - Abnormal Male indicating possible myocardial injury.   Clinicians would have to utilize clinical acumen, EKG, Troponin, and serial changes to determine if it is an Acute Myocardial Infarction or myocardial injury due to an underlying chronic condition.        TSH - Normal   MAGNESIUM - Normal   HIGH SENSITIVITIY TROPONIN T 2HR   BASIC METABOLIC PANEL   CBC WITH AUTO DIFFERENTIAL   CBC AND DIFFERENTIAL    Narrative:     The following orders were created for panel order CBC & Differential.  Procedure                               Abnormality         Status                     ---------                               -----------         ------                     CBC Auto Differential[[id number]]        Abnormal            Final result                 Please view results for these tests on the individual orders.     XR Chest 1  View    Result Date: 3/4/2024  Impression: No acute cardiopulmonary abnormality. Electronically Signed: Jeferson Ford MD  3/4/2024 2:31 AM EST  Workstation ID: [id number]   Medications   sodium chloride 0.9 % flush 10 mL (has no administration in time range)   sodium chloride 0.9 % flush 10 mL (has no administration in time range)   sodium chloride 0.9 % flush 10 mL (has no administration in time range)   sodium chloride 0.9 % infusion 40 mL (has no administration in time range)   acetaminophen (TYLENOL) tablet 650 mg (has no administration in time range)   ondansetron (ZOFRAN) injection 4 mg (has no administration in time range)   melatonin tablet 5 mg (has no administration in time range)   sennosides-docusate (PERICOLACE) 8.6-50 MG per tablet 2 tablet (has no administration in time range)     And   polyethylene glycol (MIRALAX) packet 17 g (has no administration in time range)     And   bisacodyl (DULCOLAX) EC tablet 5 mg (has no administration in time range)     And   bisacodyl (DULCOLAX) suppository 10 mg (has no administration in time range)               HEART Score: 2                              Medical Decision Making  Patient presents today with the above complaint.    She had the above exam evaluation.  She is placed on continuous cardiac monitor.  IV was established.  Labs EKG and chest x-ray were obtained.    Workup: My EKG interpretation shows sinus rhythm with ventricular rate of 97, no acute ST or T wave changes, this is corroborated by Dr. Adkins.  CBC significant for hemoglobin of 11.7.  Metabolic panel significant for glucose 152.  Magnesium within normal limits at 1.7.  TSH within normal limits.  Initial high-sensitivity troponin is 10.  proBNP within normal notes at 44.9.  Chest x-ray shows no acute abnormality.    Findings were discussed with patient.  She will be placed in observation unit for further cardiac monitoring and cardiology consult.    Amount and/or Complexity of Data  Reviewed  Labs: ordered.  Radiology: ordered.  ECG/medicine tests: ordered.    Risk  Prescription drug management.        Final diagnoses:   Chest pain, unspecified type   Palpitations       ED Disposition  ED Disposition       ED Disposition   Decision to Admit    Condition   --    Comment   --               No follow-up provider specified.       Medication List      No changes were made to your prescriptions during this visit.            Nikia Resendiz, OTONIEL  03/04/24 0354

## 2024-03-04 NOTE — H&P
[facility] Observation Unit H&P    Patient Name: Jaye Flores  : 1960  MRN: [id number]  Primary Care Physician: Srinivas Osman APRN  Date of admission: 3/4/2024     Patient Care Team:  Srinivas Osman APRN as PCP - General (Nurse Practitioner)  Luis Ambrosio MD as Consulting Physician (Cardiology)          Subjective   History Present Illness     Chief Complaint:   Chief Complaint   Patient presents with    Irregular Heart Beat     Pt reports irregular heart beat and states she will wake up and her heart is fluttering.  Pt denies any CP but states she is having L arm pain.       Irregular Heart Beat     History of Present Illness  ED 2024  Patient is a 64-year-old white female with history of hypertension, hyperlipidemia, diabetes, Takotsubo cardiomyopathy. She presents today from home with complaints of palpitations chest tightness and discomfort in her left arm. States this been an ongoing issue intermittently last month or 2 but she feels like it is getting worse. No associated shortness of breath. She states this wakes her from her sleep at night.    Observation 2024  Patient agrees with HPI noted above. Reports intermittent chest pain for the past 3 months. Has seen Dr Ambrosio in 2024 and states that he adjusted her antihypertensive medications and instructed her to monitor BP at home. She does not think it has been running high. Came to ED yesterday due to palpitations and left arm pain. Currently asymptomatic.      ROS  Review of Systems   Constitutional:  Negative for fever.   Respiratory:  Positive for chest tightness. Negative for shortness of breath.    Cardiovascular:  Positive for palpitations. Negative for chest pain and leg swelling.   Gastrointestinal:  Negative for diarrhea and vomiting.          Personal History     Past Medical History:   Past Medical History:   Diagnosis Date    Diabetes mellitus 2020    HTN (hypertension) 2020     Hypercholesterolemia 11/13/2020    Hypothyroidism 11/13/2020    Takotsubo cardiomyopathy 11/13/2020       Surgical History:      Past Surgical History:   Procedure Laterality Date    BACK SURGERY      CARDIAC CATHETERIZATION N/A 11/13/2020    Procedure: Left Heart Cath;  Surgeon: Luis Ambrosio MD;  Location: [facility] CATH INVASIVE LOCATION;  Service: Cardiology;  Laterality: N/A;    CARDIAC CATHETERIZATION N/A 11/13/2020    Procedure: Left ventriculography;  Surgeon: Luis Ambrosio MD;  Location: [facility] CATH INVASIVE LOCATION;  Service: Cardiology;  Laterality: N/A;    CARDIAC CATHETERIZATION N/A 11/13/2020    Procedure: Coronary angiography;  Surgeon: Luis Ambrosio MD;  Location: [facility] CATH INVASIVE LOCATION;  Service: Cardiology;  Laterality: N/A;    CHOLECYSTECTOMY      LAPAROSCOPIC TUBAL LIGATION      THROAT SURGERY             Family History: family history includes Heart disease in her father, paternal aunt, and paternal grandmother; Hypertension in her paternal grandfather. Otherwise pertinent FHx was reviewed and unremarkable.     Social History:  reports that she has never smoked. She has never been exposed to tobacco smoke. She has never used smokeless tobacco. She reports that she does not currently use alcohol. She reports that she does not use drugs.      Medications:  Prior to Admission medications    Medication Sig Start Date End Date Taking? Authorizing Provider   levothyroxine (SYNTHROID, LEVOTHROID) 137 MCG tablet Take 1 tablet by mouth Daily.   Yes Azul Herbert MD   losartan (COZAAR) 100 MG tablet Take 1 tablet by mouth Daily.   Yes Azul Herbert MD   lovastatin (MEVACOR) 40 MG tablet Take 1 tablet by mouth Every Night.   Yes Azul Herbert MD   metFORMIN ER (GLUCOPHAGE-XR) 500 MG 24 hr tablet Take 2 tablets by mouth 2 (Two) Times a Day.   Yes Azul Herbert MD   metoprolol succinate XL (TOPROL-XL) 50 MG 24 hr tablet Take 1 tablet by mouth Daily.   Yes Piedad  MD Azul   montelukast (SINGULAIR) 10 MG tablet Take 1 tablet by mouth Every Night.   Yes Azul Herbert MD   atorvastatin (LIPITOR) 80 MG tablet Take 0.5 tablets by mouth Daily.  3/4/24  Azul Herbert MD   cetirizine (zyrTEC) 10 MG tablet Take 1 tablet by mouth Daily.  3/4/24  Azul Herbert MD   cyclobenzaprine (FLEXERIL) 5 MG tablet Take 1 tablet by mouth 3 (Three) Times a Day As Needed for Muscle Spasms.  3/4/24  Azul Herbert MD   metFORMIN (GLUCOPHAGE) 1000 MG tablet Take 1 tablet by mouth 2 (Two) Times a Day With Meals.  3/4/24  Azul Herbert MD   montelukast (SINGULAIR) 4 MG pack Take 1 packet by mouth Every Night.  3/4/24  Azul Herbert MD       Allergies:    Allergies   Allergen Reactions    Lisinopril Anaphylaxis    Naproxen Anaphylaxis    Nifedipine Anaphylaxis    Sulfa Antibiotics GI Intolerance       Objective   Objective     Vital Signs  Temp:  [97.6 °F (36.4 °C)-97.9 °F (36.6 °C)] 97.6 °F (36.4 °C)  Heart Rate:  [] 74  Resp:  [11-20] 11  BP: (104-176)/(58-94) 150/75  SpO2:  [94 %-100 %] 96 %  on   ;   Device (Oxygen Therapy): room air  Body mass index is 33.55 kg/m².    Physical Exam  Vitals and nursing note reviewed.   Constitutional:       Appearance: Normal appearance. She is obese.   HENT:      Head: Normocephalic and atraumatic.      Right Ear: External ear normal.      Left Ear: External ear normal.      Nose: Nose normal.      Mouth/Throat:      Mouth: Mucous membranes are moist.      Pharynx: Oropharynx is clear.   Eyes:      Extraocular Movements: Extraocular movements intact.   Cardiovascular:      Rate and Rhythm: Normal rate and regular rhythm.      Pulses: Normal pulses.      Heart sounds: Normal heart sounds.   Pulmonary:      Effort: Pulmonary effort is normal.      Breath sounds: Normal breath sounds.   Abdominal:      General: Abdomen is flat. Bowel sounds are normal.      Palpations: Abdomen is soft.   Musculoskeletal:          General: Normal range of motion.      Cervical back: Normal range of motion.   Skin:     General: Skin is warm.   Neurological:      General: No focal deficit present.      Mental Status: She is alert and oriented to person, place, and time.   Psychiatric:         Mood and Affect: Mood normal.         Behavior: Behavior normal.         Results Review:  I have personally reviewed most recent cardiac tracings, lab results, and radiology images and interpretations and agree with findings, most notably: Troponin, CMP, TSH, CBC, CXR and EKG.    Results from last 7 days   Lab Units 03/04/24 0435 03/04/24 0214   WBC 10*3/mm3 6.40 7.00   HEMOGLOBIN g/dL 11.4* 11.7*   HEMATOCRIT % 35.6 36.7   PLATELETS 10*3/mm3 278 285   INR   --  <0.93*     Results from last 7 days   Lab Units 03/04/24 0435 03/04/24 0214   SODIUM mmol/L 143 143   POTASSIUM mmol/L 3.6 3.6   CHLORIDE mmol/L 106 105   CO2 mmol/L 24.0 26.0   BUN mg/dL 13 13   CREATININE mg/dL 0.66 0.69   GLUCOSE mg/dL 143* 152*   CALCIUM mg/dL 8.9 9.1   ALK PHOS U/L  --  91   ALT (SGPT) U/L  --  13   AST (SGOT) U/L  --  16   HSTROP T ng/L 8 10   PROBNP pg/mL  --  44.9     Estimated Creatinine Clearance: 89.5 mL/min (by C-G formula based on SCr of 0.66 mg/dL).  Brief Urine Lab Results       None            Microbiology Results (last 10 days)       ** No results found for the last 240 hours. **            ECG/EMG Results (most recent)       Procedure Component Value Units Date/Time    ECG 12 Lead Rhythm Change [[id number]] Collected: 03/04/24 0106     Updated: 03/04/24 0800     QT Interval 383 ms      QTC Interval 488 ms     Narrative:      HEART RATE= 97  bpm  RR Interval= 616  ms  NV Interval= 168  ms  P Horizontal Axis= 16  deg  P Front Axis= 26  deg  QRSD Interval= 95  ms  QT Interval= 383  ms  QTcB= 488  ms  QRS Axis= -39  deg  T Wave Axis= 33  deg  - OTHERWISE NORMAL ECG -  Sinus rhythm  Left axis deviation  When compared with ECG of 13-Nov-2020 12:14:45,  No  significant change  Electronically Signed By: Ha Adkins (Donte) 04-Mar-2024 07:59:48  Date and Time of Study: 2024-03-04 01:06:31            Results for orders placed during the hospital encounter of 07/24/20    Duplex Venous Lower Extremity - Left    Interpretation Summary  · Normal left lower extremity venous duplex scan.      Results for orders placed during the hospital encounter of 05/13/22    Adult Transthoracic Echo Complete W/ Cont if Necessary Per Protocol    Interpretation Summary  · Left ventricular ejection fraction appears to be 56 - 60%.  · The right ventricular cavity is mildly dilated.  · There is calcification of the aortic valve.  · No pericardial effusion noted      XR Chest 1 View    Result Date: 3/4/2024  Impression: No acute cardiopulmonary abnormality. Electronically Signed: Jeferson Ford MD  3/4/2024 2:31 AM EST  Workstation ID: [id number]       Estimated Creatinine Clearance: 89.5 mL/min (by C-G formula based on SCr of 0.66 mg/dL).    Assessment & Plan   Assessment/Plan       Active Hospital Problems    Diagnosis  POA    **Chest pain [R07.9]  Yes      Resolved Hospital Problems   No resolved problems to display.       Palpitations and chest pain  Lab Results   Component Value Date    TROPONINT 8 03/04/2024    TROPONINT 10 03/04/2024    TROPONINT 0.167 (C) 11/13/2020   -proBNP 44.9  -CMP unremarkable except hyperglycemia  -TSH within normal range  -CBC showed hemoglobin 11.4 but otherwise generally unremarkable  -Chest x-ray showed no acute cardiopulmonary abnormality  -EKG showed sinus rhythm with heart rate of 97, SD interval 168 and   -Cardiology consulted  -Telemetry      Hypertension  -Moderately controlled   BP Readings from Last 1 Encounters:   03/04/24 150/75   - Continue Cozaar and metoprolol  - Monitor while admitted     Diabetes mellitus  -Moderately controlled   Lab Results   Component Value Date    GLUCOSE 143 (H) 03/04/2024    GLUCOSE 152 (H) 03/04/2024    GLUCOSE 233 (H)  11/15/2020    GLUCOSE 179 (H) 11/14/2020   -Hold metformin for now  -Diabetic diet  -SSI  -Monitor before meals and at bedtime     Obesity  -BMI 32.55  -Lifestyle modifications    VTE Prophylaxis -   Mechanical Order History:        Ordered        03/04/24 0353  Place Sequential Compression Device  Once            03/04/24 0353  Maintain Sequential Compression Device  Continuous                          Pharmalogical Order History:       None            CODE STATUS:    There are no questions and answers to display.       This patient has been examined wearing personal protective equipment.     I discussed the patient's findings and my recommendations with patient and nursing staff.      Signature:Electronically signed by OTONIEL Carrillo, 03/04/24, 12:33 PM EST.

## 2024-03-04 NOTE — PLAN OF CARE
Problem: Adult Inpatient Plan of Care  Goal: Plan of Care Review  Outcome: Ongoing, Progressing  Goal: Patient-Specific Goal (Individualized)  Outcome: Ongoing, Progressing  Goal: Absence of Hospital-Acquired Illness or Injury  Outcome: Ongoing, Progressing  Intervention: Identify and Manage Fall Risk  Recent Flowsheet Documentation  Taken 3/4/2024 1400 by Antonio Harper RN  Safety Promotion/Fall Prevention: safety round/check completed  Taken 3/4/2024 1200 by Antonio Harper RN  Safety Promotion/Fall Prevention: safety round/check completed  Taken 3/4/2024 1000 by Antonio Harper RN  Safety Promotion/Fall Prevention: safety round/check completed  Taken 3/4/2024 0910 by Antonio Harper RN  Safety Promotion/Fall Prevention: safety round/check completed  Intervention: Prevent Skin Injury  Recent Flowsheet Documentation  Taken 3/4/2024 0910 by Antonio Harper RN  Body Position: position changed independently  Intervention: Prevent and Manage VTE (Venous Thromboembolism) Risk  Recent Flowsheet Documentation  Taken 3/4/2024 0910 by Antonio Harper RN  Activity Management: up ad leonila  Goal: Optimal Comfort and Wellbeing  Outcome: Ongoing, Progressing  Intervention: Provide Person-Centered Care  Recent Flowsheet Documentation  Taken 3/4/2024 0910 by Antonio Harper RN  Trust Relationship/Rapport: care explained  Goal: Readiness for Transition of Care  Outcome: Ongoing, Progressing  Intervention: Mutually Develop Transition Plan  Recent Flowsheet Documentation  Taken 3/4/2024 0906 by Antonio Harper RN  Transportation Anticipated: car, drives self  Patient/Family Anticipated Services at Transition: none  Patient/Family Anticipates Transition to: home  Taken 3/4/2024 0905 by Antonio Harper RN  Equipment Currently Used at Home:   bp cuff   pulse ox   Goal Outcome Evaluation:   Pt admitted for chest pain no complaints cardiology following

## 2024-03-04 NOTE — CASE MANAGEMENT/SOCIAL WORK
Discharge Planning Assessment   Ron     Patient Name: Jaye Flores  MRN: [id number]  Today's Date: 3/4/2024    Admit Date: 3/4/2024    Plan: Return home with spouse   Discharge Needs Assessment       Row Name 03/04/24 1356       Living Environment    People in Home spouse    Name(s) of People in Home randy Armas    Current Living Arrangements home    Potentially Unsafe Housing Conditions none    In the past 12 months has the electric, gas, oil, or water company threatened to shut off services in your home? No    Primary Care Provided by self    Provides Primary Care For no one    Family Caregiver if Needed spouse    Quality of Family Relationships helpful;involved;supportive    Able to Return to Prior Arrangements yes       Resource/Environmental Concerns    Resource/Environmental Concerns none    Transportation Concerns none       Transportation Needs    In the past 12 months, has lack of transportation kept you from medical appointments or from getting medications? no    In the past 12 months, has lack of transportation kept you from meetings, work, or from getting things needed for daily living? No       Food Insecurity    Within the past 12 months, you worried that your food would run out before you got the money to buy more. Never true    Within the past 12 months, the food you bought just didn't last and you didn't have money to get more. Never true       Transition Planning    Patient/Family Anticipates Transition to home with family    Patient/Family Anticipated Services at Transition none    Transportation Anticipated car, drives self;family or friend will provide       Discharge Needs Assessment    Readmission Within the Last 30 Days no previous admission in last 30 days    Equipment Currently Used at Home bp cuff;pulse ox    Concerns to be Addressed no discharge needs identified    Anticipated Changes Related to Illness none    Equipment Needed After Discharge none                   Discharge  Plan       Row Name 03/04/24 1797       Plan    Plan Return home with spouse    Patient/Family in Agreement with Plan yes    Plan Comments CM met with Mrs. Flores who reported she lives with her , drives and is independent and does not use any equipment for mobility. CM verifed PCP and Pharmacy and she denied difficulty purchasing meds, meals or utilities. CM will follow as needed                Demographic Summary       Row Name 03/04/24 1352       General Information    Admission Type observation    Arrived From emergency department    Required Notices Provided Observation Status Notice    Referral Source admission list    Reason for Consult discharge planning    Preferred Language English       Contact Information    Permission Granted to Share Info With                    Functional Status       Row Name 03/04/24 4984       Functional Status    Usual Activity Tolerance good    Current Activity Tolerance moderate       Functional Status, IADL    Medications independent    Meal Preparation independent    Housekeeping independent    Laundry independent    Shopping independent       Mental Status    General Appearance WDL WDL       Mental Status Summary    Recent Changes in Mental Status/Cognitive Functioning no changes       Employment/    Employment Status disabled                    Maintained distance greater than six feet and spent less than 15 minutes in the room.     Daiana CRANDALL,RN Case Manager  [facility]  Phone: Desk- [phone] cell- [phone]

## 2024-03-04 NOTE — SIGNIFICANT NOTE
03/04/24 1350   Living Situation   Current Living Arrangements home   Potentially Unsafe Housing Conditions none   Food Insecurity   Within the past 12 months, you worried that your food would run out before you got the money to buy more. Never true   Within the past 12 months, the food you bought just didn't last and you didn't have money to get more. Never true   Transportation Needs   In the past 12 months, has lack of transportation kept you from medical appointments or from getting medications? no   In the past 12 months, has lack of transportation kept you from meetings, work, or from getting things needed for daily living? No   Utilities   In the past 12 months has the electric, gas, oil, or water company threatened to shut off services in your home? No   Abuse Screen (yes response referral indicated)   Feels Unsafe at Home or Work/School no   Feels Threatened by Someone no   Does Anyone Try to Keep You From Having Contact with Others or Doing Things Outside Your Home? no   Physical Signs of Abuse Present no   Family and Community Support   If for any reason you need help with day-to-day activities such as bathing, preparing meals, shopping, managing finances, etc., do you get the help you need? I don't need any help   How often do you feel lonely or isolated from those around you? Never   Education   Preferred Language English   Do you want help with school or training? For example, starting or completing job training or getting a high school diploma, GED or equivalent No   Physical Activity   On average, how many days per week do you engage in moderate to strenuous exercise (like a brisk walk)? 3 days   On average, how many minutes do you engage in exercise at this level? 30 min   Number of minutes of exercise per week (!) 90   Alcohol Use   Q1: How often do you have a drink containing alcohol? Never   Q2: How many drinks containing alcohol do you have on a typical day when you are drinking? None   Q3:  How often do you have six or more drinks on one occasion? Never   Stress   Do you feel stress - tense, restless, nervous, or anxious, or unable to sleep at night because your mind is troubled all the time - these days? Not at all   Mental Health   Little Interest or Pleasure in Doing Things 0-->not at all   Feeling Down, Depressed or Hopeless 0-->not at all   Disabilities   Concentrating, Remembering or Making Decisions Difficulty no   Doing Errands Independently Difficulty (such as shopping) no

## 2024-03-05 ENCOUNTER — APPOINTMENT (OUTPATIENT)
Dept: NUCLEAR MEDICINE | Facility: HOSPITAL | Age: 64
End: 2024-03-05
Payer: MEDICAID

## 2024-03-05 VITALS
OXYGEN SATURATION: 94 % | BODY MASS INDEX: 33.55 KG/M2 | RESPIRATION RATE: 16 BRPM | DIASTOLIC BLOOD PRESSURE: 77 MMHG | TEMPERATURE: 97.4 F | HEART RATE: 77 BPM | SYSTOLIC BLOOD PRESSURE: 135 MMHG | WEIGHT: 189.38 LBS | HEIGHT: 63 IN

## 2024-03-05 LAB
ANION GAP SERPL CALCULATED.3IONS-SCNC: 11 MMOL/L (ref 5–15)
BASOPHILS # BLD AUTO: 0.1 10*3/MM3 (ref 0–0.2)
BASOPHILS NFR BLD AUTO: 1.2 % (ref 0–1.5)
BH CV REST NUCLEAR ISOTOPE DOSE: 11 MCI
BH CV STRESS BP STAGE 1: NORMAL
BH CV STRESS BP STAGE 2: NORMAL
BH CV STRESS DURATION MIN STAGE 1: 3
BH CV STRESS DURATION MIN STAGE 2: 8
BH CV STRESS DURATION SEC STAGE 1: 0
BH CV STRESS DURATION SEC STAGE 2: 0
BH CV STRESS GRADE STAGE 1: 10
BH CV STRESS GRADE STAGE 2: 12
BH CV STRESS HR STAGE 1: 113
BH CV STRESS HR STAGE 2: 126
BH CV STRESS METS STAGE 1: 5
BH CV STRESS METS STAGE 2: 7.5
BH CV STRESS NUCLEAR ISOTOPE DOSE: 32.6 MCI
BH CV STRESS PROTOCOL 1: NORMAL
BH CV STRESS RECOVERY BP: NORMAL MMHG
BH CV STRESS RECOVERY HR: 90 BPM
BH CV STRESS SPEED STAGE 1: 1.7
BH CV STRESS SPEED STAGE 2: 2.5
BH CV STRESS STAGE 1: 1
BH CV STRESS STAGE 2: 2
BUN SERPL-MCNC: 14 MG/DL (ref 8–23)
BUN/CREAT SERPL: 20 (ref 7–25)
CALCIUM SPEC-SCNC: 8.7 MG/DL (ref 8.6–10.5)
CHLORIDE SERPL-SCNC: 106 MMOL/L (ref 98–107)
CO2 SERPL-SCNC: 25 MMOL/L (ref 22–29)
CREAT SERPL-MCNC: 0.7 MG/DL (ref 0.57–1)
DEPRECATED RDW RBC AUTO: 46.4 FL (ref 37–54)
EGFRCR SERPLBLD CKD-EPI 2021: 96.7 ML/MIN/1.73
EOSINOPHIL # BLD AUTO: 0.4 10*3/MM3 (ref 0–0.4)
EOSINOPHIL NFR BLD AUTO: 7 % (ref 0.3–6.2)
ERYTHROCYTE [DISTWIDTH] IN BLOOD BY AUTOMATED COUNT: 15.2 % (ref 12.3–15.4)
GLUCOSE BLDC GLUCOMTR-MCNC: 133 MG/DL (ref 70–105)
GLUCOSE BLDC GLUCOMTR-MCNC: 228 MG/DL (ref 70–105)
GLUCOSE SERPL-MCNC: 136 MG/DL (ref 65–99)
HCT VFR BLD AUTO: 35.5 % (ref 34–46.6)
HGB BLD-MCNC: 11.3 G/DL (ref 12–15.9)
LV EF NUC BP: 60 %
LYMPHOCYTES # BLD AUTO: 2.5 10*3/MM3 (ref 0.7–3.1)
LYMPHOCYTES NFR BLD AUTO: 47.7 % (ref 19.6–45.3)
MAXIMAL PREDICTED HEART RATE: 156 BPM
MCH RBC QN AUTO: 26.3 PG (ref 26.6–33)
MCHC RBC AUTO-ENTMCNC: 31.8 G/DL (ref 31.5–35.7)
MCV RBC AUTO: 82.9 FL (ref 79–97)
MONOCYTES # BLD AUTO: 0.4 10*3/MM3 (ref 0.1–0.9)
MONOCYTES NFR BLD AUTO: 6.8 % (ref 5–12)
NEUTROPHILS NFR BLD AUTO: 2 10*3/MM3 (ref 1.7–7)
NEUTROPHILS NFR BLD AUTO: 37.3 % (ref 42.7–76)
NRBC BLD AUTO-RTO: 0 /100 WBC (ref 0–0.2)
PERCENT MAX PREDICTED HR: 80.77 %
PLATELET # BLD AUTO: 253 10*3/MM3 (ref 140–450)
PMV BLD AUTO: 9.1 FL (ref 6–12)
POTASSIUM SERPL-SCNC: 3.6 MMOL/L (ref 3.5–5.2)
RBC # BLD AUTO: 4.29 10*6/MM3 (ref 3.77–5.28)
SODIUM SERPL-SCNC: 142 MMOL/L (ref 136–145)
STRESS BASELINE BP: NORMAL MMHG
STRESS BASELINE HR: 80 BPM
STRESS PERCENT HR: 95 %
STRESS POST PEAK BP: NORMAL MMHG
STRESS POST PEAK HR: 126 BPM
STRESS TARGET HR: 133 BPM
WBC NRBC COR # BLD AUTO: 5.2 10*3/MM3 (ref 3.4–10.8)

## 2024-03-05 PROCEDURE — 0 TECHNETIUM TETROFOSMIN KIT: Performed by: EMERGENCY MEDICINE

## 2024-03-05 PROCEDURE — 85025 COMPLETE CBC W/AUTO DIFF WBC: CPT | Performed by: NURSE PRACTITIONER

## 2024-03-05 PROCEDURE — G0378 HOSPITAL OBSERVATION PER HR: HCPCS

## 2024-03-05 PROCEDURE — 99232 SBSQ HOSP IP/OBS MODERATE 35: CPT | Performed by: INTERNAL MEDICINE

## 2024-03-05 PROCEDURE — A9502 TC99M TETROFOSMIN: HCPCS | Performed by: EMERGENCY MEDICINE

## 2024-03-05 PROCEDURE — 93018 CV STRESS TEST I&R ONLY: CPT | Performed by: INTERNAL MEDICINE

## 2024-03-05 PROCEDURE — 78452 HT MUSCLE IMAGE SPECT MULT: CPT

## 2024-03-05 PROCEDURE — 82948 REAGENT STRIP/BLOOD GLUCOSE: CPT | Performed by: NURSE PRACTITIONER

## 2024-03-05 PROCEDURE — 93017 CV STRESS TEST TRACING ONLY: CPT

## 2024-03-05 PROCEDURE — 78452 HT MUSCLE IMAGE SPECT MULT: CPT | Performed by: INTERNAL MEDICINE

## 2024-03-05 PROCEDURE — 80048 BASIC METABOLIC PNL TOTAL CA: CPT | Performed by: NURSE PRACTITIONER

## 2024-03-05 RX ADMIN — Medication 10 ML: at 08:14

## 2024-03-05 RX ADMIN — LOSARTAN POTASSIUM 100 MG: 50 TABLET, FILM COATED ORAL at 08:14

## 2024-03-05 RX ADMIN — TETROFOSMIN 1 DOSE: 1.38 INJECTION, POWDER, LYOPHILIZED, FOR SOLUTION INTRAVENOUS at 07:02

## 2024-03-05 RX ADMIN — METOPROLOL SUCCINATE 50 MG: 50 TABLET, EXTENDED RELEASE ORAL at 08:14

## 2024-03-05 RX ADMIN — ATORVASTATIN CALCIUM 10 MG: 10 TABLET, FILM COATED ORAL at 08:14

## 2024-03-05 RX ADMIN — LEVOTHYROXINE SODIUM 137 MCG: 0.11 TABLET ORAL at 05:33

## 2024-03-05 RX ADMIN — TETROFOSMIN 1 DOSE: 1.38 INJECTION, POWDER, LYOPHILIZED, FOR SOLUTION INTRAVENOUS at 09:40

## 2024-03-05 NOTE — DISCHARGE SUMMARY
[facility]    Srinivas OsmanOTONIEL    CHIEF COMPLAINT:     Irregular Heart Beat     HISTORY OF PRESENT ILLNESS:    HPI    History of Present Illness  ED 03/04/2024  Patient is a 64-year-old white female with history of hypertension, hyperlipidemia, diabetes, Takotsubo cardiomyopathy. She presents today from home with complaints of palpitations chest tightness and discomfort in her left arm. States this been an ongoing issue intermittently last month or 2 but she feels like it is getting worse. No associated shortness of breath. She states this wakes her from her sleep at night.     Observation 03/04/2024  Patient agrees with HPI noted above. Reports intermittent chest pain for the past 3 months. Has seen Dr Ambrosio in January of 2024 and states that he adjusted her antihypertensive medications and instructed her to monitor BP at home. She does not think it has been running high. Came to ED yesterday due to palpitations and left arm pain. Currently asymptomatic.     Observation 03/05/2024  Patient denies any acute distress or complaints today including chest pain or left arm pain. Stress test results pending.     Past Medical History:   Diagnosis Date    Diabetes mellitus 11/13/2020    HTN (hypertension) 11/13/2020    Hypercholesterolemia 11/13/2020    Hypothyroidism 11/13/2020    Takotsubo cardiomyopathy 11/13/2020     Past Surgical History:   Procedure Laterality Date    BACK SURGERY      CARDIAC CATHETERIZATION N/A 11/13/2020    Procedure: Left Heart Cath;  Surgeon: Luis Ambrosio MD;  Location: [facility] INVASIVE LOCATION;  Service: Cardiology;  Laterality: N/A;    CARDIAC CATHETERIZATION N/A 11/13/2020    Procedure: Left ventriculography;  Surgeon: Luis Ambrosio MD;  Location: [facility] INVASIVE LOCATION;  Service: Cardiology;  Laterality: N/A;    CARDIAC CATHETERIZATION N/A 11/13/2020    Procedure: Coronary angiography;  Surgeon: Luis Ambrosio MD;  Location: [facility]  INVASIVE LOCATION;  Service: Cardiology;  Laterality: N/A;    CHOLECYSTECTOMY      LAPAROSCOPIC TUBAL LIGATION      THROAT SURGERY       Family History   Problem Relation Age of Onset    Heart disease Father     Heart disease Paternal Aunt     Heart disease Paternal Grandmother     Hypertension Paternal Grandfather      Social History     Tobacco Use    Smoking status: Never     Passive exposure: Never    Smokeless tobacco: Never   Vaping Use    Vaping status: Never Used   Substance Use Topics    Alcohol use: Not Currently    Drug use: Never     Medications Prior to Admission   Medication Sig Dispense Refill Last Dose    levothyroxine (SYNTHROID, LEVOTHROID) 137 MCG tablet Take 1 tablet by mouth Daily.       losartan (COZAAR) 100 MG tablet Take 1 tablet by mouth Daily.       lovastatin (MEVACOR) 40 MG tablet Take 1 tablet by mouth Every Night.       metFORMIN ER (GLUCOPHAGE-XR) 500 MG 24 hr tablet Take 2 tablets by mouth 2 (Two) Times a Day.       metoprolol succinate XL (TOPROL-XL) 50 MG 24 hr tablet Take 1 tablet by mouth Daily.       montelukast (SINGULAIR) 10 MG tablet Take 1 tablet by mouth Every Night.        Allergies:  Lisinopril, Naproxen, Nifedipine, and Sulfa antibiotics    Immunization History   Administered Date(s) Administered    Fluzone (or Fluarix & Flulaval for VFC) >6mos 11/15/2020           REVIEW OF SYSTEMS:    ROS  ROS  Review of Systems   Constitutional:  Negative for fever.   Respiratory:  Positive for chest tightness. Negative for shortness of breath.    Cardiovascular:  Positive for palpitations. Negative for chest pain and leg swelling.   Gastrointestinal:  Negative for diarrhea and vomiting.        Vital Signs  Temp:  [97.4 °F (36.3 °C)-98.3 °F (36.8 °C)] 97.4 °F (36.3 °C)  Heart Rate:  [71-79] 77  Resp:  [14-16] 16  BP: (106-153)/(56-88) 135/77          Physical Exam:  Physical Exam  Vitals and nursing note reviewed.   Constitutional:       Appearance: Normal appearance. She is obese.    HENT:      Head: Normocephalic and atraumatic.      Right Ear: External ear normal.      Left Ear: External ear normal.      Nose: Nose normal.      Mouth/Throat:      Mouth: Mucous membranes are moist.      Pharynx: Oropharynx is clear.   Eyes:      Extraocular Movements: Extraocular movements intact.   Cardiovascular:      Rate and Rhythm: Normal rate and regular rhythm.      Pulses: Normal pulses.      Heart sounds: Normal heart sounds.   Pulmonary:      Effort: Pulmonary effort is normal.      Breath sounds: Normal breath sounds.   Abdominal:      General: Abdomen is flat. Bowel sounds are normal.      Palpations: Abdomen is soft.   Musculoskeletal:         General: Normal range of motion.      Cervical back: Normal range of motion.   Skin:     General: Skin is warm.   Neurological:      General: No focal deficit present.      Mental Status: She is alert and oriented to person, place, and time.   Psychiatric:         Mood and Affect: Mood normal.         Behavior: Behavior normal.           Emotional Behavior:    Normal    Debilities:   None  Results Review:    I reviewed the patient's new clinical results.  Lab Results (most recent)       Procedure Component Value Units Date/Time    POC Glucose 4x Daily Before Meals & at Bedtime [[id number]]  (Abnormal) Collected: 03/05/24 0730    Specimen: Blood Updated: 03/05/24 0734     Glucose 133 mg/dL      Comment: Serial Number: [id number]:  [id number]       Basic Metabolic Panel [[id number]]  (Abnormal) Collected: 03/05/24 0433    Specimen: Blood from Hand, Right Updated: 03/05/24 0620     Glucose 136 mg/dL      BUN 14 mg/dL      Creatinine 0.70 mg/dL      Sodium 142 mmol/L      Potassium 3.6 mmol/L      Chloride 106 mmol/L      CO2 25.0 mmol/L      Calcium 8.7 mg/dL      BUN/Creatinine Ratio 20.0     Anion Gap 11.0 mmol/L      eGFR 96.7 mL/min/1.73     Narrative:      GFR Normal >60  Chronic Kidney Disease <60  Kidney Failure <15      CBC & Differential  [[id number]]  (Abnormal) Collected: 03/05/24 0433    Specimen: Blood from Hand, Right Updated: 03/05/24 0600    Narrative:      The following orders were created for panel order CBC & Differential.  Procedure                               Abnormality         Status                     ---------                               -----------         ------                     CBC Auto Differential[[id number]]        Abnormal            Final result                 Please view results for these tests on the individual orders.    CBC Auto Differential [[id number]]  (Abnormal) Collected: 03/05/24 0433    Specimen: Blood from Hand, Right Updated: 03/05/24 0600     WBC 5.20 10*3/mm3      RBC 4.29 10*6/mm3      Hemoglobin 11.3 g/dL      Hematocrit 35.5 %      MCV 82.9 fL      MCH 26.3 pg      MCHC 31.8 g/dL      RDW 15.2 %      RDW-SD 46.4 fl      MPV 9.1 fL      Platelets 253 10*3/mm3      Neutrophil % 37.3 %      Lymphocyte % 47.7 %      Monocyte % 6.8 %      Eosinophil % 7.0 %      Basophil % 1.2 %      Neutrophils, Absolute 2.00 10*3/mm3      Lymphocytes, Absolute 2.50 10*3/mm3      Monocytes, Absolute 0.40 10*3/mm3      Eosinophils, Absolute 0.40 10*3/mm3      Basophils, Absolute 0.10 10*3/mm3      nRBC 0.0 /100 WBC     POC Glucose Once [[id number]]  (Abnormal) Collected: 03/04/24 2043    Specimen: Blood Updated: 03/04/24 2044     Glucose 149 mg/dL      Comment: Serial Number: [id number]:  [id number]       Hemoglobin A1c [[id number]]  (Abnormal) Collected: 03/04/24 0435    Specimen: Blood Updated: 03/04/24 1328     Hemoglobin A1C 7.70 %     High Sensitivity Troponin T 2Hr [[id number]]  (Normal) Collected: 03/04/24 0435    Specimen: Blood Updated: 03/04/24 0514     HS Troponin T 8 ng/L      Troponin T Delta -2 ng/L     Narrative:      High Sensitive Troponin T Reference Range:  <14.0 ng/L- Negative Female for AMI  <22.0 ng/L- Negative Male for AMI  >=14 - Abnormal Female indicating possible myocardial injury.  >=22 -  Abnormal Male indicating possible myocardial injury.   Clinicians would have to utilize clinical acumen, EKG, Troponin, and serial changes to determine if it is an Acute Myocardial Infarction or myocardial injury due to an underlying chronic condition.         Basic Metabolic Panel [[id number]]  (Abnormal) Collected: 03/04/24 0435    Specimen: Blood Updated: 03/04/24 0514     Glucose 143 mg/dL      BUN 13 mg/dL      Creatinine 0.66 mg/dL      Sodium 143 mmol/L      Potassium 3.6 mmol/L      Chloride 106 mmol/L      CO2 24.0 mmol/L      Calcium 8.9 mg/dL      BUN/Creatinine Ratio 19.7     Anion Gap 13.0 mmol/L      eGFR 98.1 mL/min/1.73     Narrative:      GFR Normal >60  Chronic Kidney Disease <60  Kidney Failure <15      CBC Auto Differential [[id number]]  (Abnormal) Collected: 03/04/24 0435    Specimen: Blood Updated: 03/04/24 0454     WBC 6.40 10*3/mm3      RBC 4.29 10*6/mm3      Hemoglobin 11.4 g/dL      Hematocrit 35.6 %      MCV 83.2 fL      MCH 26.6 pg      MCHC 32.0 g/dL      RDW 15.5 %      RDW-SD 45.1 fl      MPV 8.8 fL      Platelets 278 10*3/mm3      Neutrophil % 41.8 %      Lymphocyte % 42.3 %      Monocyte % 8.0 %      Eosinophil % 6.9 %      Basophil % 1.0 %      Neutrophils, Absolute 2.70 10*3/mm3      Lymphocytes, Absolute 2.70 10*3/mm3      Monocytes, Absolute 0.50 10*3/mm3      Eosinophils, Absolute 0.40 10*3/mm3      Basophils, Absolute 0.10 10*3/mm3      nRBC 0.0 /100 WBC     BNP [[id number]]  (Normal) Collected: 03/04/24 0214    Specimen: Blood Updated: 03/04/24 0249     proBNP 44.9 pg/mL     Narrative:      This assay is used as an aid in the diagnosis of individuals suspected of having heart failure. It can be used as an aid in the diagnosis of acute decompensated heart failure (ADHF) in patients presenting with signs and symptoms of ADHF to the emergency department (ED). In addition, NT-proBNP of <300 pg/mL indicates ADHF is not likely.    Age Range Result Interpretation  NT-proBNP  Concentration (pg/mL:      <50             Positive            >450                   Gray                 300-450                    Negative             <300    50-75           Positive            >900                  Gray                300-900                  Negative            <300      >75             Positive            >1800                  Gray                300-1800                  Negative            <300    High Sensitivity Troponin T [[id number]]  (Normal) Collected: 03/04/24 0214    Specimen: Blood Updated: 03/04/24 0249     HS Troponin T 10 ng/L     Narrative:      High Sensitive Troponin T Reference Range:  <14.0 ng/L- Negative Female for AMI  <22.0 ng/L- Negative Male for AMI  >=14 - Abnormal Female indicating possible myocardial injury.  >=22 - Abnormal Male indicating possible myocardial injury.   Clinicians would have to utilize clinical acumen, EKG, Troponin, and serial changes to determine if it is an Acute Myocardial Infarction or myocardial injury due to an underlying chronic condition.         TSH [[id number]]  (Normal) Collected: 03/04/24 0214    Specimen: Blood Updated: 03/04/24 0249     TSH 2.890 uIU/mL     aPTT [[id number]]  (Normal) Collected: 03/04/24 0214    Specimen: Blood Updated: 03/04/24 0246     PTT 24.5 seconds     Protime-INR [[id number]]  (Abnormal) Collected: 03/04/24 0214    Specimen: Blood Updated: 03/04/24 0246     Protime 9.8 Seconds      INR <0.93    Comprehensive Metabolic Panel [[id number]]  (Abnormal) Collected: 03/04/24 0214    Specimen: Blood Updated: 03/04/24 0245     Glucose 152 mg/dL      BUN 13 mg/dL      Creatinine 0.69 mg/dL      Sodium 143 mmol/L      Potassium 3.6 mmol/L      Comment: Slight hemolysis detected by analyzer. Result may be falsely elevated.        Chloride 105 mmol/L      CO2 26.0 mmol/L      Calcium 9.1 mg/dL      Total Protein 7.3 g/dL      Albumin 4.2 g/dL      ALT (SGPT) 13 U/L      AST (SGOT) 16 U/L      Alkaline Phosphatase 91 U/L       Total Bilirubin 0.4 mg/dL      Globulin 3.1 gm/dL      A/G Ratio 1.4 g/dL      BUN/Creatinine Ratio 18.8     Anion Gap 12.0 mmol/L      eGFR 97.1 mL/min/1.73     Narrative:      GFR Normal >60  Chronic Kidney Disease <60  Kidney Failure <15      Magnesium [[id number]]  (Normal) Collected: 03/04/24 0214    Specimen: Blood Updated: 03/04/24 0245     Magnesium 1.7 mg/dL     CBC & Differential [[id number]]  (Abnormal) Collected: 03/04/24 0214    Specimen: Blood Updated: 03/04/24 0218    Narrative:      The following orders were created for panel order CBC & Differential.  Procedure                               Abnormality         Status                     ---------                               -----------         ------                     CBC Auto Differential[[id number]]        Abnormal            Final result                 Please view results for these tests on the individual orders.            Imaging Results (Most Recent)       Procedure Component Value Units Date/Time    XR Chest 1 View [[id number]] Collected: 03/04/24 0230     Updated: 03/04/24 0233    Narrative:      XR CHEST 1 VW    Date of Exam: 3/4/2024 1:59 AM EST    Indication: chest tightness    Comparison: 11/13/2020.    Findings:  There is no pneumothorax, pleural effusion or focal airspace consolidation. Heart size and pulmonary vasculature appear within normal limits. Regional bones appear intact.      Impression:      Impression:  No acute cardiopulmonary abnormality.        Electronically Signed: Jeferson Ford MD    3/4/2024 2:31 AM EST    Workstation ID: [id number]          reviewed    ECG/EMG Results (most recent)       Procedure Component Value Units Date/Time    ECG 12 Lead Rhythm Change [[id number]] Collected: 03/04/24 0106     Updated: 03/04/24 0800     QT Interval 383 ms      QTC Interval 488 ms     Narrative:      HEART RATE= 97  bpm  RR Interval= 616  ms  KS Interval= 168  ms  P Horizontal Axis= 16  deg  P Front Axis= 26  deg  QRSD Interval=  95  ms  QT Interval= 383  ms  QTcB= 488  ms  QRS Axis= -39  deg  T Wave Axis= 33  deg  - OTHERWISE NORMAL ECG -  Sinus rhythm  Left axis deviation  When compared with ECG of 13-Nov-2020 12:14:45,  No significant change  Electronically Signed By: Ha Adkins (Donte) 04-Mar-2024 07:59:48  Date and Time of Study: 2024-03-04 01:06:31    Telemetry Scan [[id number]] Resulted: 03/04/24     Updated: 03/04/24 1638    Telemetry Scan [[id number]] Resulted: 03/04/24     Updated: 03/04/24 2217    Telemetry Scan [[id number]] Resulted: 03/04/24     Updated: 03/05/24 0821    Telemetry Scan [[id number]] Resulted: 03/04/24     Updated: 03/05/24 1112    Telemetry Scan [[id number]] Resulted: 03/04/24     Updated: 03/05/24 1150          reviewed    Results for orders placed during the hospital encounter of 07/24/20    Duplex Venous Lower Extremity - Left    Interpretation Summary  · Normal left lower extremity venous duplex scan.      Results for orders placed during the hospital encounter of 05/13/22    Adult Transthoracic Echo Complete W/ Cont if Necessary Per Protocol    Interpretation Summary  · Left ventricular ejection fraction appears to be 56 - 60%.  · The right ventricular cavity is mildly dilated.  · There is calcification of the aortic valve.  · No pericardial effusion noted      Microbiology Results (last 10 days)       ** No results found for the last 240 hours. **            Assessment & Plan     Chest pain             Palpitations and chest pain        Lab Results   Component Value Date     TROPONINT 8 03/04/2024     TROPONINT 10 03/04/2024     TROPONINT 0.167 (C) 11/13/2020   -proBNP 44.9  -CMP unremarkable except hyperglycemia  -TSH within normal range  -CBC showed hemoglobin 11.4 but otherwise generally unremarkable  -Chest x-ray showed no acute cardiopulmonary abnormality  -EKG showed sinus rhythm with heart rate of 97, NM interval 168 and   -Cardiology consulted and ordered a stress test  -Stress test showed no signs  of ischemia   -Cardiology cleared patient for discharge and recommended sleep study, outpatient referral given.   -Telemetry        Hypertension  -Moderately controlled   BP Readings from Last 1 Encounters:   03/05/24 135/77   - Continue Cozaar and metoprolol  - Monitor while admitted      Diabetes mellitus  -Moderately controlled   Lab Results   Component Value Date    GLUCOSE 136 (H) 03/05/2024    GLUCOSE 143 (H) 03/04/2024    GLUCOSE 152 (H) 03/04/2024    GLUCOSE 233 (H) 11/15/2020   -Hold metformin for now  -Diabetic diet  -SSI  -Monitor before meals and at bedtime      Obesity  -BMI 32.55  -Lifestyle modifications       I discussed the patients findings and my recommendations with patient and nursing staff.     Discharge Diagnosis:      Chest pain      Hospital Course  Patient is a 64 y.o. female presented with chest pain and palpitation as noted on HPI above.  Labs unremarkable including troponin, proBNP, CMP, TSH, CBC.  Chest x-ray showed no acute cardiopulmonary abnormality and EKG showed sinus rhythm.  Cardiology was consulted and ordered a stress test which showed no signs of ischemia.  Cardiology recommended outpatient sleep study, referral given. At this time, patient felt to be in good condition for discharge with close follow up with PCP and cardiology. Instructed to take all medications as prescribed and to return to ED if any concerning signs/symptoms. All test/lab results were discussed with patient. All questions were answered and patient verbalizes understanding.       Past Medical History:     Past Medical History:   Diagnosis Date    Diabetes mellitus 11/13/2020    HTN (hypertension) 11/13/2020    Hypercholesterolemia 11/13/2020    Hypothyroidism 11/13/2020    Takotsubo cardiomyopathy 11/13/2020       Past Surgical History:     Past Surgical History:   Procedure Laterality Date    BACK SURGERY      CARDIAC CATHETERIZATION N/A 11/13/2020    Procedure: Left Heart Cath;  Surgeon: Luis Ambrosio MD;   Location: [facility] CATH INVASIVE LOCATION;  Service: Cardiology;  Laterality: N/A;    CARDIAC CATHETERIZATION N/A 11/13/2020    Procedure: Left ventriculography;  Surgeon: Luis Ambrosio MD;  Location: [facility] CATH INVASIVE LOCATION;  Service: Cardiology;  Laterality: N/A;    CARDIAC CATHETERIZATION N/A 11/13/2020    Procedure: Coronary angiography;  Surgeon: Luis Ambrosio MD;  Location: [facility] CATH INVASIVE LOCATION;  Service: Cardiology;  Laterality: N/A;    CHOLECYSTECTOMY      LAPAROSCOPIC TUBAL LIGATION      THROAT SURGERY         Social History:   Social History     Socioeconomic History    Marital status:    Tobacco Use    Smoking status: Never     Passive exposure: Never    Smokeless tobacco: Never   Vaping Use    Vaping status: Never Used   Substance and Sexual Activity    Alcohol use: Not Currently    Drug use: Never    Sexual activity: Defer       Procedures Performed         Consults:   Consults       Date and Time Order Name Status Description    3/4/2024  3:53 AM Inpatient Cardiology Consult Completed             Condition on Discharge:     Stable    Discharge Disposition  Home or Self Care    Discharge Medications     Discharge Medications        Continue These Medications        Instructions Start Date   levothyroxine 137 MCG tablet  Commonly known as: SYNTHROID, LEVOTHROID   137 mcg, Oral, Daily      losartan 100 MG tablet  Commonly known as: COZAAR   100 mg, Oral, Daily      lovastatin 40 MG tablet  Commonly known as: MEVACOR   40 mg, Oral, Nightly      metFORMIN  MG 24 hr tablet  Commonly known as: GLUCOPHAGE-XR   1,000 mg, Oral, 2 Times Daily      metoprolol succinate XL 50 MG 24 hr tablet  Commonly known as: TOPROL-XL   50 mg, Oral, Daily      montelukast 10 MG tablet  Commonly known as: SINGULAIR   10 mg, Oral, Nightly               Discharge Diet:   Diet Instructions       Diet: Diabetic Diets, Cardiac Diets; Healthy Heart (2-3 Na+); Regular (IDDSI 7); Thin (IDDSI 0); Consistent  Carbohydrate      Discharge Diet:  Diabetic Diets  Cardiac Diets       Cardiac Diet: Healthy Heart (2-3 Na+)    Texture: Regular (IDDSI 7)    Fluid Consistency: Thin (IDDSI 0)    Diabetic Diet: Consistent Carbohydrate            Activity at Discharge:     Follow-up Appointments  Future Appointments   Date Time Provider Department Center   7/23/2024  2:50 PM Luis Ambrosio MD [facility]     Additional Instructions for the Follow-ups that You Need to Schedule       Ambulatory Referral to Sleep Medicine   As directed      Follow-up needed: Yes        Discharge Follow-up with PCP   As directed       Currently Documented PCP:    Srinivas Osman APRN    PCP Phone Number:    [phone]     Follow Up Details: 5-7 days        Discharge Follow-up with Specified Provider: Cardiology; 2 Weeks   As directed      To: Cardiology   Follow Up: 2 Weeks                Test Results Pending at Discharge       Risk for Readmission (LACE) Score: 2 (3/5/2024  6:00 AM)      Greater than 30 minutes spent in discharge activities for this patient    Signature:Electronically signed by OTONIEL Carrillo, 03/05/24, 11:34 AM EST.

## 2024-03-05 NOTE — PROGRESS NOTES
CARDIOLOGY PROGRESS NOTE:    Jaye Flores  64 y.o.  female  1960  [id number]      Referring Provider: Hospitalist    Reason for follow-up: Chest pain and palpitation     Patient Care Team:  Srinivas Osman APRN as PCP - General (Nurse Practitioner)  Luis Ambrosio MD as Consulting Physician (Cardiology)    Subjective .  Patient is doing well without any symptoms today    Objective lying in bed comfortably     Review of Systems   Constitutional: Negative for malaise/fatigue.   Cardiovascular:  Negative for chest pain, dyspnea on exertion, leg swelling and palpitations.   Respiratory:  Negative for cough and shortness of breath.    Gastrointestinal:  Negative for abdominal pain, nausea and vomiting.   Neurological:  Negative for dizziness, focal weakness, headaches, light-headedness and numbness.   All other systems reviewed and are negative.      Allergies: Lisinopril, Naproxen, Nifedipine, and Sulfa antibiotics    Scheduled Meds:atorvastatin, 10 mg, Oral, Daily  insulin lispro, 2-9 Units, Subcutaneous, 4x Daily AC & at Bedtime  levothyroxine, 137 mcg, Oral, Q AM  losartan, 100 mg, Oral, Daily  metoprolol succinate XL, 50 mg, Oral, Daily  montelukast, 10 mg, Oral, Nightly  sodium chloride, 10 mL, Intravenous, Q12H      Continuous Infusions:   PRN Meds:.  acetaminophen    aluminum-magnesium hydroxide-simethicone    senna-docusate sodium **AND** polyethylene glycol **AND** bisacodyl **AND** bisacodyl    dextrose    dextrose    glucagon (human recombinant)    melatonin    Morphine    ondansetron    [COMPLETED] Insert Peripheral IV **AND** sodium chloride    sodium chloride    sodium chloride        VITAL SIGNS  Vitals:    03/04/24 2100 03/05/24 0000 03/05/24 0530 03/05/24 1058   BP: 143/80 106/56 148/88 135/77   BP Location: Left arm  Left arm Left arm   Patient Position: Lying  Lying Lying   Pulse: 71 79 74 77   Resp: 16  16 16   Temp: 98 °F (36.7 °C)  97.9 °F (36.6 °C) 97.4 °F (36.3 °C)   TempSrc: Oral   "Oral Oral   SpO2: 99% 92% 94%    Weight:       Height:           Flowsheet Rows      Flowsheet Row First Filed Value   Admission Height 160 cm (63\") Documented at 03/04/2024 0049   Admission Weight 85.9 kg (189 lb 6 oz) Documented at 03/04/2024 0049             TELEMETRY: Sinus rhythm    Physical Exam:  Constitutional:       Appearance: Well-developed.   Eyes:      General: No scleral icterus.     Conjunctiva/sclera: Conjunctivae normal.   HENT:      Head: Normocephalic and atraumatic.   Neck:      Vascular: No carotid bruit or JVD.   Pulmonary:      Effort: Pulmonary effort is normal.      Breath sounds: Normal breath sounds. No wheezing. No rales.   Cardiovascular:      Normal rate. Regular rhythm.   Pulses:     Intact distal pulses.   Abdominal:      General: Bowel sounds are normal.      Palpations: Abdomen is soft.   Musculoskeletal:      Cervical back: Normal range of motion and neck supple. Skin:     General: Skin is warm and dry.      Findings: No rash.   Neurological:      Mental Status: Alert.          Results Review:   I reviewed the patient's new clinical results.  Lab Results (last 24 hours)       Procedure Component Value Units Date/Time    POC Glucose 4x Daily Before Meals & at Bedtime [[id number]]  (Abnormal) Collected: 03/05/24 1137    Specimen: Blood Updated: 03/05/24 1139     Glucose 228 mg/dL      Comment: Serial Number: [id number]:  [id number]       POC Glucose 4x Daily Before Meals & at Bedtime [[id number]]  (Abnormal) Collected: 03/05/24 0730    Specimen: Blood Updated: 03/05/24 0734     Glucose 133 mg/dL      Comment: Serial Number: [id number]:  [id number]       Basic Metabolic Panel [[id number]]  (Abnormal) Collected: 03/05/24 0433    Specimen: Blood from Hand, Right Updated: 03/05/24 0620     Glucose 136 mg/dL      BUN 14 mg/dL      Creatinine 0.70 mg/dL      Sodium 142 mmol/L      Potassium 3.6 mmol/L      Chloride 106 mmol/L      CO2 25.0 mmol/L      Calcium 8.7 mg/dL      " BUN/Creatinine Ratio 20.0     Anion Gap 11.0 mmol/L      eGFR 96.7 mL/min/1.73     Narrative:      GFR Normal >60  Chronic Kidney Disease <60  Kidney Failure <15      CBC & Differential [[id number]]  (Abnormal) Collected: 03/05/24 0433    Specimen: Blood from Hand, Right Updated: 03/05/24 0600    Narrative:      The following orders were created for panel order CBC & Differential.  Procedure                               Abnormality         Status                     ---------                               -----------         ------                     CBC Auto Differential[[id number]]        Abnormal            Final result                 Please view results for these tests on the individual orders.    CBC Auto Differential [[id number]]  (Abnormal) Collected: 03/05/24 0433    Specimen: Blood from Hand, Right Updated: 03/05/24 0600     WBC 5.20 10*3/mm3      RBC 4.29 10*6/mm3      Hemoglobin 11.3 g/dL      Hematocrit 35.5 %      MCV 82.9 fL      MCH 26.3 pg      MCHC 31.8 g/dL      RDW 15.2 %      RDW-SD 46.4 fl      MPV 9.1 fL      Platelets 253 10*3/mm3      Neutrophil % 37.3 %      Lymphocyte % 47.7 %      Monocyte % 6.8 %      Eosinophil % 7.0 %      Basophil % 1.2 %      Neutrophils, Absolute 2.00 10*3/mm3      Lymphocytes, Absolute 2.50 10*3/mm3      Monocytes, Absolute 0.40 10*3/mm3      Eosinophils, Absolute 0.40 10*3/mm3      Basophils, Absolute 0.10 10*3/mm3      nRBC 0.0 /100 WBC     POC Glucose Once [[id number]]  (Abnormal) Collected: 03/04/24 2043    Specimen: Blood Updated: 03/04/24 2044     Glucose 149 mg/dL      Comment: Serial Number: [id number]:  [id number]       POC Glucose Once [[id number]]  (Abnormal) Collected: 03/04/24 1607    Specimen: Blood Updated: 03/04/24 1609     Glucose 136 mg/dL      Comment: Serial Number: [id number]:  [id number]       Hemoglobin A1c [[id number]]  (Abnormal) Collected: 03/04/24 0435    Specimen: Blood Updated: 03/04/24 1328     Hemoglobin A1C 7.70 %              Imaging Results (Last 24 Hours)       ** No results found for the last 24 hours. **            EKG      I personally viewed and interpreted the patient's EKG/Telemetry data:    ECHOCARDIOGRAM:  Results for orders placed during the hospital encounter of 22    Adult Transthoracic Echo Complete W/ Cont if Necessary Per Protocol    Interpretation Summary  · Left ventricular ejection fraction appears to be 56 - 60%.  · The right ventricular cavity is mildly dilated.  · There is calcification of the aortic valve.  · No pericardial effusion noted       STRESS MYOVIEW:  Results for orders placed during the hospital encounter of 24    Stress Test With Myocardial Perfusion One Day    Interpretation Summary    Myocardial perfusion imaging indicates a normal myocardial perfusion study with no evidence of ischemia. Impressions are consistent with a low risk study.    Left ventricular ejection fraction is normal (Calculated EF = 60%).    Low risk for ischemic heart disease.    Blood pressure response is hypertensive       CARDIAC CATHETERIZATION:  Results for orders placed during the hospital encounter of 20    Cardiac Catheterization/Vascular Study    Narrative  Heart Cath Report    NAME:              Caty Barnes  :                1960  AGE/SEX:        60 y.o. female  MRN:                [id number]  ADM DATE:      [unfilled]  DOS:      Pre-Procedure Notes  H&P Performed  [x]  Yes []  No       []  N/A    Indications:  [x]  ACS <= 24 HRS  []  ACS >24 HRS  [x]  New Onset Angina <= 2 mos  []  Worsening Angina  []  Resuscitated Cardiac Arrest  []  Angina on Exertion:  []  Suspected CAD  []  Valvular Disease  []  Pericardial Disease  []  Cardiac Arrythmia  []  Cardiomyopathy  []  LV Dysfunction  []  Syncope  []  Post Cardiac Transplant  []  Eval. For Exercise Clearance  []  Abnormal Stress Test  []  Other  []  Pre-Operative Evaluation  If Pre-Op Eval:  Evaluation for Surgery Type:  []  Cardiac  Surgery   []  Non-Cardiac Surgery  Functional Capacity:  []  <4 METS  []  >=4 METS w/o symptoms  []  >= 4 METS with symptoms  []  Unknown  Surgical Risk:  []  Low  []  Intermediate  []  High Risk: Vascular  []  High Risk Non-Vascular    Risks, Benefits, & Complications Discussed:  [x]  Yes  []  No  []  N/A    Questions Answered:  [x]  Yes  []  No  []  N/A    Consent Obtained:  [x]  Yes  []  No  []  N/A    CHF: [x]  Yes  []  No  If Yes:  Newly Diagnosed?  []  Yes  []  No  If Yes:  HF Type:  []  Diastolic  [x]  Systolic  []  Unknown      Procedure Description  The patient underwent successful [x]  Left  []  Right  []  Left & Right  Heart catheterization and coronary angiography via the  [x]  Femoral approach  []  Radial approach  []  Brachial approach    Procedure Narrative:  Informed consent was obtained from the patient after explaining risks and benefits.  Patient was brought to the cardiac catheterization laboratory and placed on the table in the usual fashion.  Right groin was shaved and prepped in sterile fashion. Moderate conscious sedation was given utilizing IV Versed and fentanyl administered by RN with continuous EKG oximetry and hemodynamic monitoring supervised by me throughout the entire case, conscious sedation time was 30 minutes.  I was present with the patient for the duration of moderate sedation and supervised staff who had no other duties and monitored the patient for the entire procedure patient had Martinez 2-3 sedation scale. 2% lidocaine was used for local anesthesia to the right groin.  A total of 20 cc was used.  A 6 Spanish sheath was placed in the right femoral artery.  A 6 Spanish pigtail catheter, 6 Spanish JR4 catheter and a 6 Spanish JL4 catheter was used for the procedure.  After the cardiac catheterization is complete, all the catheters and sheath were removed.  Patient tolerated the procedure very well.  No complications noted.      Hemodynamic    LVEDP:8   Estimated EF %: 30 to  35%    Initial Aortic Pressure: 150/80    AV Gradient: No gradient  Rt. Heart Pressure:    Wall Motion:  Dominance:  []  Left  [x]  Right  []  Co-Dominant    Coronary Arteriography: (Please Code highest degree of stenosis)    Left Main %:   0  Proximal LAD %:  0  Mid/Distal LAD %:  0  LCX %: 0  Ramus:  RCA %: 0  Lima %:  SVG(s) %:      Complications: No complications    Estimated Blood Loss:  None      Impression and Recommendation: Normal coronaries  Takotsubo cardiomyopathy with LV dysfunction  Continue medical therapy    Electronically signed by Luis Ambrosio MD, 11/13/20, 1:45 PM EST       OTHER:         Assessment & Plan     Angina  Patient presented with chest pain and palpitations and is ruled out for MI by EKG and enzymes  Patient had an echo recently which showed normal LV function  Patient had a stress Myoview study which did not show any ischemia.  Blood pressure response is hypertensive and needs to be adjusted.     Hypertension  Patient blood pressure better now with Cozaar and metoprolol  During exercise patient had hypertensive response to exercise and hence needs adjustment of her medications.  This blood pressure increase could be the reason why she is having chest pains.        Diabetes  Patient's sugar levels are still high and will adjust the medicines by the primary care doctor     *Hyperlipidemia  Patient is on statins and the lipid levels are well within normal limits     Sleep apnea  Patient has sleep apnea and will need a sleep study in the machine.  Patient will follow with her primary doctor will have her CPAP machine    I discussed the patients findings and my recommendations with patient and nurse    Luis Ambrosio MD  03/05/24  12:04 EST

## 2024-03-05 NOTE — PLAN OF CARE
Problem: Adult Inpatient Plan of Care  Goal: Plan of Care Review  Outcome: Met  Goal: Patient-Specific Goal (Individualized)  Outcome: Met  Goal: Absence of Hospital-Acquired Illness or Injury  Outcome: Met  Intervention: Identify and Manage Fall Risk  Recent Flowsheet Documentation  Taken 3/5/2024 1200 by Antonio Harper RN  Safety Promotion/Fall Prevention: safety round/check completed  Taken 3/5/2024 1000 by Antonio Harper RN  Safety Promotion/Fall Prevention: patient off unit  Taken 3/5/2024 0800 by Antonio Harper RN  Safety Promotion/Fall Prevention: safety round/check completed  Intervention: Prevent Skin Injury  Recent Flowsheet Documentation  Taken 3/5/2024 0800 by Antonio Harper RN  Body Position: position changed independently  Intervention: Prevent and Manage VTE (Venous Thromboembolism) Risk  Recent Flowsheet Documentation  Taken 3/5/2024 0800 by Antonio Harper RN  Activity Management: up ad leonila  Goal: Optimal Comfort and Wellbeing  Outcome: Met  Intervention: Provide Person-Centered Care  Recent Flowsheet Documentation  Taken 3/5/2024 0800 by Antonio Harper RN  Trust Relationship/Rapport: care explained  Goal: Readiness for Transition of Care  Outcome: Met   Goal Outcome Evaluation:   Pt to discharge home

## 2024-03-05 NOTE — CASE MANAGEMENT/SOCIAL WORK
Continued Stay Note   Ron     Patient Name: Jaye Flores  MRN: [id number]  Today's Date: 3/5/2024    Admit Date: 3/4/2024    Plan: Return home with spouse   Discharge Plan       Row Name 03/05/24 1024       Plan    Plan Comments Barriers: Pending Myoview                          Daiana CRANDALL,RN Case Manager  [facility]  Phone: Desk- [phone] cell- [phone]

## 2024-03-05 NOTE — PLAN OF CARE
Goal Outcome Evaluation:  Plan of Care Reviewed With: patient        Progress: no change  Outcome Evaluation: Pt rested throughout the night without complaints. NSR VSS Pt scheduled for myoview this am  will await further orders from MD

## 2025-03-17 RX ORDER — LOSARTAN POTASSIUM 100 MG/1
100 TABLET ORAL DAILY
Qty: 90 TABLET | Refills: 0 | Status: SHIPPED | OUTPATIENT
Start: 2025-03-17

## 2025-03-17 NOTE — TELEPHONE ENCOUNTER
Rx Refill Note  Requested Prescriptions     Signed Prescriptions Disp Refills    losartan (COZAAR) 100 MG tablet 90 tablet 0     Sig: Take 1 tablet by mouth once daily     Authorizing Provider: MIRIAN GREGORY     Ordering User: MARS ZAMUDIO      Last office visit with prescribing clinician: 1/16/2024   Last telemedicine visit with prescribing clinician: Visit date not found   Next office visit with prescribing clinician: Visit date not found                         Would you like a call back once the refill request has been completed: [] Yes [] No    If the office needs to give you a call back, can they leave a voicemail: [] Yes [] No    Mars Zamudio MA  03/17/25, 16:52 EDT

## 2025-08-12 RX ORDER — LOSARTAN POTASSIUM 100 MG/1
100 TABLET ORAL DAILY
Qty: 90 TABLET | Refills: 0 | Status: SHIPPED | OUTPATIENT
Start: 2025-08-12

## (undated) DEVICE — CATH DIAG IMPULSE FL4 6F 100CM

## (undated) DEVICE — ELECTRD DEFIB M/FUNC PROPADZ RADIOL 2PK

## (undated) DEVICE — PK TRY HEART CATH 50

## (undated) DEVICE — HI-TORQUE WHISPER MS GUIDE WIRE .014 STRAIGHT TIP 3.0 CM X 190 CM: Brand: HI-TORQUE WHISPER

## (undated) DEVICE — CATH DIAG IMPULSE FR4 6F 100CM

## (undated) DEVICE — BOWL PLSTC MD 16OZ BLU STRL

## (undated) DEVICE — SYR LL TP 10ML STRL

## (undated) DEVICE — CONTRST ISOVUE300 61PCT 50ML

## (undated) DEVICE — GW DIAG EMERALD HEPCOAT MOVE JTIP STD .035 3MM 150CM

## (undated) DEVICE — STPCK 3WY HP ROT

## (undated) DEVICE — PINNACLE INTRODUCER SHEATH: Brand: PINNACLE

## (undated) DEVICE — CATH DIAG IMPULSE PIG .056 6F 110CM

## (undated) DEVICE — RADIFOCUS OBTURATOR: Brand: RADIFOCUS

## (undated) DEVICE — TBG NAMIC PRESS MONTR A/ F/M 12IN

## (undated) DEVICE — MEDICINE CUP, GRADUATED, STER: Brand: MEDLINE

## (undated) DEVICE — SKIN PREP TRAY W/CHG: Brand: MEDLINE INDUSTRIES, INC.

## (undated) DEVICE — DEV INFL COMPAK W/ACCESSPLUS IN4530